# Patient Record
Sex: FEMALE | Race: WHITE | NOT HISPANIC OR LATINO | URBAN - METROPOLITAN AREA
[De-identification: names, ages, dates, MRNs, and addresses within clinical notes are randomized per-mention and may not be internally consistent; named-entity substitution may affect disease eponyms.]

---

## 2017-12-16 ENCOUNTER — EMERGENCY (EMERGENCY)
Facility: HOSPITAL | Age: 42
LOS: 1 days | Discharge: HOME | End: 2017-12-16
Attending: EMERGENCY MEDICINE
Payer: COMMERCIAL

## 2017-12-16 VITALS
WEIGHT: 132.28 LBS | HEIGHT: 67 IN | OXYGEN SATURATION: 100 % | HEART RATE: 80 BPM | SYSTOLIC BLOOD PRESSURE: 127 MMHG | RESPIRATION RATE: 16 BRPM | TEMPERATURE: 98 F | DIASTOLIC BLOOD PRESSURE: 74 MMHG

## 2017-12-16 PROCEDURE — 72125 CT NECK SPINE W/O DYE: CPT | Mod: 26

## 2017-12-16 PROCEDURE — 12011 RPR F/E/E/N/L/M 2.5 CM/<: CPT

## 2017-12-16 PROCEDURE — 99285 EMERGENCY DEPT VISIT HI MDM: CPT

## 2017-12-16 PROCEDURE — 90471 IMMUNIZATION ADMIN: CPT

## 2017-12-16 PROCEDURE — 90715 TDAP VACCINE 7 YRS/> IM: CPT

## 2017-12-16 PROCEDURE — 70486 CT MAXILLOFACIAL W/O DYE: CPT | Mod: 26

## 2017-12-16 PROCEDURE — 70486 CT MAXILLOFACIAL W/O DYE: CPT

## 2017-12-16 PROCEDURE — 70450 CT HEAD/BRAIN W/O DYE: CPT

## 2017-12-16 PROCEDURE — 99285 EMERGENCY DEPT VISIT HI MDM: CPT | Mod: 25

## 2017-12-16 PROCEDURE — 72125 CT NECK SPINE W/O DYE: CPT

## 2017-12-16 PROCEDURE — 70450 CT HEAD/BRAIN W/O DYE: CPT | Mod: 26

## 2017-12-16 RX ORDER — ACETAMINOPHEN 500 MG
975 TABLET ORAL ONCE
Qty: 0 | Refills: 0 | Status: COMPLETED | OUTPATIENT
Start: 2017-12-16 | End: 2017-12-16

## 2017-12-16 RX ORDER — TETANUS TOXOID, REDUCED DIPHTHERIA TOXOID AND ACELLULAR PERTUSSIS VACCINE, ADSORBED 5; 2.5; 8; 8; 2.5 [IU]/.5ML; [IU]/.5ML; UG/.5ML; UG/.5ML; UG/.5ML
0.5 SUSPENSION INTRAMUSCULAR ONCE
Qty: 0 | Refills: 0 | Status: COMPLETED | OUTPATIENT
Start: 2017-12-16 | End: 2017-12-16

## 2017-12-16 RX ADMIN — TETANUS TOXOID, REDUCED DIPHTHERIA TOXOID AND ACELLULAR PERTUSSIS VACCINE, ADSORBED 0.5 MILLILITER(S): 5; 2.5; 8; 8; 2.5 SUSPENSION INTRAMUSCULAR at 11:12

## 2017-12-16 RX ADMIN — Medication 975 MILLIGRAM(S): at 11:11

## 2017-12-16 NOTE — ED PROVIDER NOTE - ENMT, MLM
Airway patent, Nasal mucosa clear. Mouth with normal mucosa. Throat has no vesicles, no oropharyngeal exudates and uvula is midline. Dried blood on L nare.

## 2017-12-16 NOTE — ED PROVIDER NOTE - MUSCULOSKELETAL, MLM
Spine appears normal, range of motion is not limited, R paraspinal tenderness. Full range of motion on all extremities.

## 2017-12-16 NOTE — ED PROVIDER NOTE - CARE PLAN
Principal Discharge DX:	Fall, initial encounter  Secondary Diagnosis:	Facial laceration, initial encounter

## 2017-12-16 NOTE — ED ADULT TRIAGE NOTE - CHIEF COMPLAINT QUOTE
Slipped and fell off stairs last night at 11am with forehead laceration facial abrasions and swelling denies LOC

## 2017-12-16 NOTE — ED PROVIDER NOTE - SKIN, MLM
3cm deep laceration over L eyebrow, multiple superficial facial abrasions, b/l wrists superficial abrasions.

## 2017-12-16 NOTE — ED PROVIDER NOTE - OBJECTIVE STATEMENT
43 y/o F pt with no significant PMHx presents to ED c/o epistaxis, L periorbital bruising, lacerations, and headache s/p fall yesterday night. Pt states that she tripped and fell down a set of stairs. Pt denies LOC, fever, chills, or any other complaints. Pt denies blood thinner use and states that she does not know when she got her last TDAP. NKDA.

## 2017-12-21 ENCOUNTER — EMERGENCY (EMERGENCY)
Facility: HOSPITAL | Age: 42
LOS: 1 days | Discharge: HOME | End: 2017-12-21
Attending: EMERGENCY MEDICINE
Payer: COMMERCIAL

## 2017-12-21 VITALS
RESPIRATION RATE: 16 BRPM | SYSTOLIC BLOOD PRESSURE: 129 MMHG | DIASTOLIC BLOOD PRESSURE: 78 MMHG | WEIGHT: 130.07 LBS | HEART RATE: 79 BPM | HEIGHT: 65 IN | OXYGEN SATURATION: 99 % | TEMPERATURE: 97 F

## 2017-12-21 PROCEDURE — G0463: CPT

## 2018-01-31 NOTE — ED ADULT TRIAGE NOTE - WEIGHT IN LBS
Hypertension complicating pregnancy, childbirth and puerperium with baby delivered and  complication
130

## 2019-12-19 NOTE — ED ADULT NURSE NOTE - PAIN RATING/NUMBER SCALE (0-10): REST
Dr. Murillo,   Please addend your office visit note from 12/11/19 to include use and benefit of nebulizer and frequency of treatments (you ordered BID). Medicare will not approve nebulizer unless this is added.   0

## 2022-09-08 NOTE — ED ADULT NURSE NOTE - BREATH SOUNDS, MLM
Custom Shielding Preamble Text Will Not Be Included With Simple Simulations (.......... X X Y Cm): A lead shield of 0.762 mm thickness is utilized to form a molded, custom shield with a Clear

## 2023-12-07 ENCOUNTER — TELEPHONE (OUTPATIENT)
Dept: HEMATOLOGY ONCOLOGY | Facility: CLINIC | Age: 48
End: 2023-12-07

## 2023-12-07 NOTE — TELEPHONE ENCOUNTER
Appointment Schedule   Who are you speaking with? Sal Hernandez   If it is not the patient, are they listed on an active communication consent form? N/A   Which provider is the appointment scheduled with? Dr. Dick Vizcarra   At which location is the appointment scheduled for? [place]   When is the appointment scheduled? Please list date and time 1/12/24 1:00pm   What is the reason for this appointment? New patient consult   Did patient voice understanding of the details of this appointment? Yes   Was the no show policy reviewed with patient?  Yes

## 2023-12-12 ENCOUNTER — PATIENT OUTREACH (OUTPATIENT)
Dept: HEMATOLOGY ONCOLOGY | Facility: CLINIC | Age: 48
End: 2023-12-12

## 2023-12-13 NOTE — PROGRESS NOTES
I received a call from patient's daughter regarding patient's medical records along with what form would be needed from [facility] for records release. I informed her it was the release of health information form. She stated she has all patient's records including imaging on a disc. She informed me she would be forwarding to our medical records department and radiology department . I also updated patient's demographics .

## 2024-01-16 ENCOUNTER — VBI (OUTPATIENT)
Dept: ADMINISTRATIVE | Facility: OTHER | Age: 49
End: 2024-01-16

## 2024-01-16 NOTE — TELEPHONE ENCOUNTER
PDMP reviewed; no aberrant behavior identified, prescription authorized for postoperative pain.     Patient is currently on pain contract with another provider. Will notify that provider that I wrote for a week of short acting pain medication and that their office will resume management in 1 week.      Upon review of the In Basket request we were able to locate, review, and update the patient chart as requested for Mammogram.    Any additional questions or concerns should be emailed to the Practice Liaisons via the appropriate education email address, please do not reply via In Basket.    Thank you  BRIONNA BLACKWELL

## 2024-01-17 ENCOUNTER — PATIENT OUTREACH (OUTPATIENT)
Dept: HEMATOLOGY ONCOLOGY | Facility: CLINIC | Age: 49
End: 2024-01-17

## 2024-01-17 DIAGNOSIS — C50.911 MALIGNANT NEOPLASM OF RIGHT BREAST IN FEMALE, ESTROGEN RECEPTOR POSITIVE, UNSPECIFIED SITE OF BREAST: Primary | ICD-10-CM

## 2024-01-17 DIAGNOSIS — Z17.0 MALIGNANT NEOPLASM OF RIGHT BREAST IN FEMALE, ESTROGEN RECEPTOR POSITIVE, UNSPECIFIED SITE OF BREAST: Primary | ICD-10-CM

## 2024-01-17 NOTE — PROGRESS NOTES
Breast Oncology Nurse Navigator    Called patient's daughter Usman for initial outreach from nurse navigator.  Left voicemail message with contact information.  Requested a call back.  Referral placed for oncology social worker.

## 2024-01-18 ENCOUNTER — OFFICE VISIT (OUTPATIENT)
Dept: HEMATOLOGY ONCOLOGY | Facility: CLINIC | Age: 49
End: 2024-01-18
Payer: COMMERCIAL

## 2024-01-18 VITALS
SYSTOLIC BLOOD PRESSURE: 120 MMHG | TEMPERATURE: 98.2 F | BODY MASS INDEX: 22.5 KG/M2 | OXYGEN SATURATION: 97 % | HEIGHT: 66 IN | WEIGHT: 140 LBS | HEART RATE: 89 BPM | RESPIRATION RATE: 17 BRPM | DIASTOLIC BLOOD PRESSURE: 84 MMHG

## 2024-01-18 DIAGNOSIS — C50.911 MALIGNANT NEOPLASM OF RIGHT BREAST IN FEMALE, ESTROGEN RECEPTOR POSITIVE, UNSPECIFIED SITE OF BREAST: Primary | ICD-10-CM

## 2024-01-18 DIAGNOSIS — C77.9 REGIONAL LYMPH NODE METASTASIS PRESENT (HCC): ICD-10-CM

## 2024-01-18 DIAGNOSIS — Z17.0 MALIGNANT NEOPLASM OF RIGHT BREAST IN FEMALE, ESTROGEN RECEPTOR POSITIVE, UNSPECIFIED SITE OF BREAST: Primary | ICD-10-CM

## 2024-01-18 PROCEDURE — 99204 OFFICE O/P NEW MOD 45 MIN: CPT | Performed by: INTERNAL MEDICINE

## 2024-01-18 RX ORDER — SODIUM, POTASSIUM,MAG SULFATES 17.5-3.13G
SOLUTION, RECONSTITUTED, ORAL ORAL
COMMUNITY
Start: 2023-11-13

## 2024-01-18 RX ORDER — ONDANSETRON 4 MG/1
4 TABLET, FILM COATED ORAL
COMMUNITY
Start: 2023-09-26

## 2024-01-18 RX ORDER — OXYCODONE HYDROCHLORIDE 5 MG/1
5 TABLET ORAL
COMMUNITY
Start: 2023-09-26

## 2024-01-18 RX ORDER — BISACODYL 5 MG/1
5 TABLET, DELAYED RELEASE ORAL
COMMUNITY
Start: 2023-11-08

## 2024-01-18 RX ORDER — ACETAMINOPHEN 325 MG/1
325 TABLET ORAL
COMMUNITY
Start: 2023-09-26

## 2024-01-22 NOTE — PROGRESS NOTES
Consultation - Medical Oncology   Brandi Godfrey 48 y.o. female MRN: [id number]  Unit/Bed#:  Encounter: [id number]  Referring physician: Self-referral  Date of service: 1/18/2024  Reason for Consult: Breast cancer  HPI: Brandi Godfrey is a 48 y.o. year old female.  She is premenopausal.  She speaks Polish.  I took help for interpretation from [facility]  #[id number].  Also patient's daughter remained on line on the speaker phone the whole time and she is bilingual.  In September 2021 patient found a mass in the central portion of her right breast and she had 1.4 cm lesion in the right breast on imaging studies and she had biopsy that showed invasive breast cancer, ER positive, SD positive and HER2 negative.  She had MRI scan of the breasts and biopsy of left breast that did not show malignancy.  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  Patient had bilateral lumpectomies and final report was 2 cm invasive ductal carcinoma, no lymphovascular invasion and negative sentinel lymph nodes.  ER positive.  SD positive.  HER2 negative.  Ki-67 6%.  Oncotype score was 24.  No family history of breast or ovarian cancer.  Patient's mother had pleural malignancy.  Menarche at age 11 and first pregnancy at age 23.  Stage was 1A (T1c N0 M0 G1 ER positive, SD positive, HER2 negative and Oncotype 24)  No other significant past history other than peptic ulcer disease and previous history of lumpectomy.  Former smoker, 10 cigarettes a day for about 20 years and she quit smoking in 2021.  No alcohol.  Patient decided against adjuvant therapy, chemotherapy and hormonal therapy.  She wanted to try alternative/natural therapy.  For CHEK2 mutation she was encouraged to have breast imaging studies, GYN examination and colonoscopy.  Patient had  colonoscopy and there was no malignancy.  Colonoscopy  showed polyps.  On 9/21/2023 patient had right breast lumpectomy and sentinel lymph node sampling and report was invasive carcinoma of no  special type, grade 1, 1.2 cm tumor, 1 of 3 positive lymph node for metastatic disease, ER 99%, CT 99%, HER2 negative (1+).  Again patient decided not to have radiation or adjuvant therapy.  Patient wanted to try alternative therapy/natural therapy and she still feels the same way.  She wants to have scans.  She will take information on tamoxifen.  She would not be interested in ovarian suppression or BSO.  No symptoms at present other than anxiety.  ROS:  01/21/24 Reviewed 12 systems: See symptoms in HPI  Presently no  neurological, cardiac, pulmonary, GI and  symptoms.  No other symptoms.   No  fever, chills, bleeding, bone pains, skin rash, weight loss, night sweats, arthritic symptoms,  tiredness , weakness, numbness, claudication and gait problem. No frequent infections.  Not unusually sensitive to heat or cold. No swelling of the ankles. No swollen glands.  Patient is anxious.       Historical Information   No past medical history on file.  Past Surgical History:   Procedure Laterality Date    MRI BREAST BIOPSY BILATERAL Bilateral 7/20/2023     Social History   Social History     Substance and Sexual Activity   Alcohol Use Not on file     Social History     Substance and Sexual Activity   Drug Use Not on file     Social History     Tobacco Use   Smoking Status Not on file   Smokeless Tobacco Not on file     Family History: No family history on file.      Current Outpatient Medications:     acetaminophen (TYLENOL) 325 mg tablet, Take 325 mg by mouth (Patient not taking: Reported on 1/18/2024), Disp: , Rfl:     bisacodyl (DULCOLAX) 5 mg EC tablet, Take 5 mg by mouth (Patient not taking: Reported on 1/18/2024), Disp: , Rfl:     Na Sulfate-K Sulfate-Mg Sulf 17.5-3.13-1.6 GM/177ML SOLN, Take as directed (Patient not taking: Reported on 1/18/2024), Disp: , Rfl:     ondansetron (ZOFRAN) 4 mg tablet, Take 4 mg by mouth (Patient not taking: Reported on 1/18/2024), Disp: , Rfl:     oxyCODONE (ROXICODONE) 5 immediate  "release tablet, Take 5 mg by mouth (Patient not taking: Reported on 1/18/2024), Disp: , Rfl:     Not on File    Physical Exam:  Vitals:    01/18/24 1449   BP: 120/84   BP Location: Left arm   Patient Position: Sitting   Cuff Size: Adult   Pulse: 89   Resp: 17   Temp: 98.2 °F (36.8 °C)   SpO2: 97%   Weight: 63.5 kg (140 lb)   Height: 5' 6\" (1.676 m)     Alert, oriented, not in distress, vitals are above, no icterus, no oral thrush, no palpable neck mass, clear lung fields, regular heart rate, abdomen  soft and non tender, no palpable abdominal mass, no ascites, no edema of ankles, no calf tenderness, no focal neurological deficit, no skin rash, no palpable lymphadenopathy in the neck and axillary areas,  no clubbing.   Patient is anxious.  Performance status 0.  No lymphedema      Lab Results: I have reviewed all pertinent labs.  LABS:  No results found for this or any previous visit.      Imaging Studies: I have personally reviewed pertinent reports.    Pathology, and Other Studies: I have personally reviewed pertinent reports.    Reviewed records from [facility].    Assessment and Plan:  See diagnoses, orders instructions below  2021-right breast cancer T1c N0 M0 G1 ER positive, ND positive, HER2 negative, Ki-67 6% and Oncotype 24.  2023-recurrent cancer and right breast, T1c, N1, M0, G1 ER positive, ND positive, HER2 negative (1+)  Patient is status post bilateral mastectomies and reconstruction surgeries.  Patient is not interested in adjuvant systemic therapy or radiation therapy.  We discussed adjuvant hormonal therapy tamoxifen plus minus ovarian suppression or BSO.  We discussed ovarian suppression or BSO plus aromatase inhibitor.  Patient would prefer not to have any adjuvant therapy but agreed to take information on tamoxifen and that was provided to her.  Discussed the significance of CHEK2 mutation and risk of cancers, breast cancer, ovarian cancer and colon cancer and probably other " cancers.  Patient was interested in having scans and blood work but no therapy.  I gave the name and phone number of Dr. Melgar in case she would like to see her and discuss with her in Polish.  Patient and her daughter would discuss at home and decide about tamoxifen and about seeing Dr. Melgar.  They said they will get back to me.  They have my cell phone number.  They understand that there is potential for cure here from breast cancer.  That will be the goal.  Patient is capable of self-care.   Patient will continue to follow with  primary physician and other consultants.  Patient voiced understanding.  1. Malignant neoplasm of right breast in female, estrogen receptor positive, unspecified site of breast     - CT chest abdomen pelvis w contrast; Future  - NM bone scan whole body; Future  - CBC and differential; Future  - Comprehensive metabolic panel; Future  - Cancer antigen 27.29; Future    2. Regional lymph node metastasis present (HCC)    - CT chest abdomen pelvis w contrast; Future  - NM bone scan whole body; Future  - CBC and differential; Future  - Comprehensive metabolic panel; Future  - Cancer antigen 27.29; Future      Disclaimer: This document was prepared using a dictation device.  If a word or phrase is confusing, or does not make sense, this is likely due to recognition error which was not discovered during the providers review. If you believe an error has occurred, please Contact me through [facility]OPE Line service for yohannescation.    Counseling / Coordination of Care  ..  Provided counseling and support

## 2024-01-23 ENCOUNTER — DOCUMENTATION (OUTPATIENT)
Dept: HEMATOLOGY ONCOLOGY | Facility: CLINIC | Age: 49
End: 2024-01-23

## 2024-01-23 ENCOUNTER — PATIENT OUTREACH (OUTPATIENT)
Dept: HEMATOLOGY ONCOLOGY | Facility: CLINIC | Age: 49
End: 2024-01-23

## 2024-01-23 NOTE — PROGRESS NOTES
Per chart review patient is declining treatment but has information on Tamoxifen. Patient and daughter will review and discuss. Called daughter Usman and left voicemail stating if patient wishes to start treatment they can contact Oncology Care Coordination. Provided my dfirect phone number and NN july Alanis's as well.     Future Appointments   Date Time Provider Department Center   2/8/2024 11:30 AM [facility] CT 1 [facility]   2/8/2024 12:00 PM [facility]   2/8/2024  2:30 PM [facility]   2/29/2024  1:20 PM Mike Cruz MD HEM ONC [facility]         OCC will follow up after patient sees Dr. Cruz on 2/29/2024. If no treatment care coordination will sign off.

## 2024-01-23 NOTE — PROGRESS NOTES
OCC outreach requested by ALVARADO Sandra Zeke after her medical oncology appt with Dr. Cruz 1/18/2024. Chart review completed prior to outreach.    9/2021-Stage 1A (T1c N0 M0 G1 ER positive, WI positive, HER2 negative and Oncotype 24) right breast cancer. 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 s/p lumpectomy. Patient declined adjuvant treatment.     9/2023- s/p R lumpectomy w/ SLNbx. Invasive carcinoma of no special type, grade 1, 1.2 cm tumor, 1 of 3 positive lymph node for metastatic disease, ER 99%, WI 99%, HER2 negative (1+). Patient declined adjuvant treatment.     1/18/2024 Dr. Cruz- Patient wants to try alternative therapy/natural therapy. She wants to have scans. She would not be interested in ovarian suppression or BSO. Information provided to patient on tamoxifen and a referral placed to Dr. Melgar.     Future Appointments   Date Time Provider Department Center   2/8/2024 11:30 AM [facility] CT 1 [facility]   2/8/2024 12:00 PM [facility]   2/8/2024  2:30 PM BE [facility] 1 [facility]   2/29/2024  1:20 PM Mike Cruz MD [facility]

## 2024-02-06 ENCOUNTER — APPOINTMENT (OUTPATIENT)
Dept: LAB | Facility: CLINIC | Age: 49
End: 2024-02-06
Payer: COMMERCIAL

## 2024-02-06 DIAGNOSIS — C50.911 MALIGNANT NEOPLASM OF RIGHT BREAST IN FEMALE, ESTROGEN RECEPTOR POSITIVE, UNSPECIFIED SITE OF BREAST: ICD-10-CM

## 2024-02-06 DIAGNOSIS — C77.9 REGIONAL LYMPH NODE METASTASIS PRESENT (HCC): ICD-10-CM

## 2024-02-06 DIAGNOSIS — Z17.0 MALIGNANT NEOPLASM OF RIGHT BREAST IN FEMALE, ESTROGEN RECEPTOR POSITIVE, UNSPECIFIED SITE OF BREAST: ICD-10-CM

## 2024-02-06 LAB
ALBUMIN SERPL BCP-MCNC: 4.1 G/DL (ref 3.5–5)
ALP SERPL-CCNC: 44 U/L (ref 34–104)
ALT SERPL W P-5'-P-CCNC: 12 U/L (ref 7–52)
ANION GAP SERPL CALCULATED.3IONS-SCNC: 7 MMOL/L
AST SERPL W P-5'-P-CCNC: 13 U/L (ref 13–39)
BASOPHILS # BLD AUTO: 0.05 THOUSANDS/ÂΜL (ref 0–0.1)
BASOPHILS NFR BLD AUTO: 1 % (ref 0–1)
BILIRUB SERPL-MCNC: 1.16 MG/DL (ref 0.2–1)
BUN SERPL-MCNC: 10 MG/DL (ref 5–25)
CALCIUM SERPL-MCNC: 9.4 MG/DL (ref 8.4–10.2)
CHLORIDE SERPL-SCNC: 105 MMOL/L (ref 96–108)
CO2 SERPL-SCNC: 28 MMOL/L (ref 21–32)
CREAT SERPL-MCNC: 0.63 MG/DL (ref 0.6–1.3)
EOSINOPHIL # BLD AUTO: 0.12 THOUSAND/ÂΜL (ref 0–0.61)
EOSINOPHIL NFR BLD AUTO: 3 % (ref 0–6)
ERYTHROCYTE [DISTWIDTH] IN BLOOD BY AUTOMATED COUNT: 12.6 % (ref 11.6–15.1)
GFR SERPL CREATININE-BSD FRML MDRD: 107 ML/MIN/1.73SQ M
GLUCOSE P FAST SERPL-MCNC: 99 MG/DL (ref 65–99)
HCT VFR BLD AUTO: 41.2 % (ref 34.8–46.1)
HGB BLD-MCNC: 14 G/DL (ref 11.5–15.4)
IMM GRANULOCYTES # BLD AUTO: 0.01 THOUSAND/UL (ref 0–0.2)
IMM GRANULOCYTES NFR BLD AUTO: 0 % (ref 0–2)
LYMPHOCYTES # BLD AUTO: 1.97 THOUSANDS/ÂΜL (ref 0.6–4.47)
LYMPHOCYTES NFR BLD AUTO: 41 % (ref 14–44)
MCH RBC QN AUTO: 29.9 PG (ref 26.8–34.3)
MCHC RBC AUTO-ENTMCNC: 34 G/DL (ref 31.4–37.4)
MCV RBC AUTO: 88 FL (ref 82–98)
MONOCYTES # BLD AUTO: 0.37 THOUSAND/ÂΜL (ref 0.17–1.22)
MONOCYTES NFR BLD AUTO: 8 % (ref 4–12)
NEUTROPHILS # BLD AUTO: 2.25 THOUSANDS/ÂΜL (ref 1.85–7.62)
NEUTS SEG NFR BLD AUTO: 47 % (ref 43–75)
NRBC BLD AUTO-RTO: 0 /100 WBCS
PLATELET # BLD AUTO: 205 THOUSANDS/UL (ref 149–390)
PMV BLD AUTO: 10.9 FL (ref 8.9–12.7)
POTASSIUM SERPL-SCNC: 4.7 MMOL/L (ref 3.5–5.3)
PROT SERPL-MCNC: 6.2 G/DL (ref 6.4–8.4)
RBC # BLD AUTO: 4.69 MILLION/UL (ref 3.81–5.12)
SODIUM SERPL-SCNC: 140 MMOL/L (ref 135–147)
WBC # BLD AUTO: 4.77 THOUSAND/UL (ref 4.31–10.16)

## 2024-02-06 PROCEDURE — 80053 COMPREHEN METABOLIC PANEL: CPT

## 2024-02-06 PROCEDURE — 36415 COLL VENOUS BLD VENIPUNCTURE: CPT

## 2024-02-06 PROCEDURE — 85025 COMPLETE CBC W/AUTO DIFF WBC: CPT

## 2024-02-06 PROCEDURE — 86300 IMMUNOASSAY TUMOR CA 15-3: CPT

## 2024-02-07 LAB — CANCER AG27-29 SERPL-ACNC: 12 U/ML (ref 0–38.6)

## 2024-02-08 ENCOUNTER — HOSPITAL ENCOUNTER (OUTPATIENT)
Dept: RADIOLOGY | Facility: HOSPITAL | Age: 49
Discharge: HOME | End: 2024-02-08
Attending: INTERNAL MEDICINE
Payer: COMMERCIAL

## 2024-02-08 DIAGNOSIS — Z17.0 MALIGNANT NEOPLASM OF RIGHT BREAST IN FEMALE, ESTROGEN RECEPTOR POSITIVE, UNSPECIFIED SITE OF BREAST: ICD-10-CM

## 2024-02-08 DIAGNOSIS — C50.911 MALIGNANT NEOPLASM OF RIGHT BREAST IN FEMALE, ESTROGEN RECEPTOR POSITIVE, UNSPECIFIED SITE OF BREAST: ICD-10-CM

## 2024-02-08 DIAGNOSIS — C77.9 REGIONAL LYMPH NODE METASTASIS PRESENT (HCC): ICD-10-CM

## 2024-02-08 PROCEDURE — G1004 CDSM NDSC: HCPCS

## 2024-02-08 PROCEDURE — 71260 CT THORAX DX C+: CPT

## 2024-02-08 PROCEDURE — 74177 CT ABD & PELVIS W/CONTRAST: CPT

## 2024-02-08 PROCEDURE — A9503 TC99M MEDRONATE: HCPCS

## 2024-02-08 PROCEDURE — 78306 BONE IMAGING WHOLE BODY: CPT

## 2024-02-08 RX ADMIN — IOHEXOL 100 ML: 350 INJECTION, SOLUTION INTRAVENOUS at 12:39

## 2024-02-16 ENCOUNTER — TELEPHONE (OUTPATIENT)
Dept: HEMATOLOGY ONCOLOGY | Facility: CLINIC | Age: 49
End: 2024-02-16

## 2024-02-21 ENCOUNTER — TELEPHONE (OUTPATIENT)
Dept: HEMATOLOGY ONCOLOGY | Facility: CLINIC | Age: 49
End: 2024-02-21

## 2024-02-21 ENCOUNTER — HOSPITAL ENCOUNTER (OUTPATIENT)
Dept: MRI IMAGING | Facility: HOSPITAL | Age: 49
Discharge: HOME | End: 2024-02-21
Attending: INTERNAL MEDICINE
Payer: COMMERCIAL

## 2024-02-21 DIAGNOSIS — C77.9 REGIONAL LYMPH NODE METASTASIS PRESENT (HCC): ICD-10-CM

## 2024-02-21 DIAGNOSIS — C50.911 MALIGNANT NEOPLASM OF RIGHT BREAST IN FEMALE, ESTROGEN RECEPTOR POSITIVE, UNSPECIFIED SITE OF BREAST: Primary | ICD-10-CM

## 2024-02-21 DIAGNOSIS — C50.911 MALIGNANT NEOPLASM OF RIGHT BREAST IN FEMALE, ESTROGEN RECEPTOR POSITIVE, UNSPECIFIED SITE OF BREAST: ICD-10-CM

## 2024-02-21 DIAGNOSIS — Z17.0 MALIGNANT NEOPLASM OF RIGHT BREAST IN FEMALE, ESTROGEN RECEPTOR POSITIVE, UNSPECIFIED SITE OF BREAST: Primary | ICD-10-CM

## 2024-02-21 DIAGNOSIS — K76.9 LIVER LESION: ICD-10-CM

## 2024-02-21 DIAGNOSIS — Z17.0 MALIGNANT NEOPLASM OF RIGHT BREAST IN FEMALE, ESTROGEN RECEPTOR POSITIVE, UNSPECIFIED SITE OF BREAST: ICD-10-CM

## 2024-02-21 PROCEDURE — A9585 GADOBUTROL INJECTION: HCPCS | Performed by: INTERNAL MEDICINE

## 2024-02-21 PROCEDURE — G1004 CDSM NDSC: HCPCS

## 2024-02-21 PROCEDURE — 74183 MRI ABD W/O CNTR FLWD CNTR: CPT

## 2024-02-21 RX ORDER — GADOBUTROL 604.72 MG/ML
6 INJECTION INTRAVENOUS
Status: COMPLETED | OUTPATIENT
Start: 2024-02-21 | End: 2024-02-21

## 2024-02-21 RX ADMIN — GADOBUTROL 6 ML: 604.72 INJECTION INTRAVENOUS at 19:20

## 2024-02-21 NOTE — TELEPHONE ENCOUNTER
----- Message from Mike Cruz MD sent at 2/21/2024 10:25 AM EST -----    ----- Message -----  From: Interface, Radiology Results In  Sent: 2/16/2024  11:34 AM EST  To: Mike Cruz MD

## 2024-02-21 NOTE — TELEPHONE ENCOUNTER
Spoke with patient's daughter to make her aware of the CT scan findings. There are two spots noted on the liver, one of which is a cyst. The other spot in unclear. Dr. Cruz would like additional imaging done with an MRI of the abdomen    Please schedule MRI before the end of the month as their insurance will run out at that time    Thanks!

## 2024-02-21 NOTE — TELEPHONE ENCOUNTER
Patient Call    Who are you speaking with? [facility]ri     If it is not the patient, are they listed on an active communication consent form? N/A   What is the reason for this call? She didn't have a script for mri scheduled at 615. I told her the order was in her chart with the info she needed   Does this require a call back? N/A   If a call back is required, please list best call back number N/a   If a call back is required, advise that a message will be forwarded to their care team and someone will return their call as soon as possible.   Did you relay this information to the patient? N/A

## 2024-02-21 NOTE — TELEPHONE ENCOUNTER
Called patient and left her know about her stat MRI order that needed to be scheduled. Got her scheduled at [place] on 2 /21/24 at  615 pm they verbally confirmed over the phone.  Thank  you

## 2024-02-29 ENCOUNTER — OFFICE VISIT (OUTPATIENT)
Dept: HEMATOLOGY ONCOLOGY | Facility: CLINIC | Age: 49
End: 2024-02-29
Payer: COMMERCIAL

## 2024-02-29 VITALS
RESPIRATION RATE: 17 BRPM | BODY MASS INDEX: 22.5 KG/M2 | HEART RATE: 61 BPM | WEIGHT: 140 LBS | HEIGHT: 66 IN | OXYGEN SATURATION: 98 % | TEMPERATURE: 98 F | DIASTOLIC BLOOD PRESSURE: 80 MMHG | SYSTOLIC BLOOD PRESSURE: 124 MMHG

## 2024-02-29 DIAGNOSIS — K76.9 LIVER LESION: ICD-10-CM

## 2024-02-29 DIAGNOSIS — Z15.02 CHEK2-RELATED BREAST CANCER: ICD-10-CM

## 2024-02-29 DIAGNOSIS — C50.919 CHEK2-RELATED BREAST CANCER: ICD-10-CM

## 2024-02-29 DIAGNOSIS — Z17.0 MALIGNANT NEOPLASM OF RIGHT BREAST IN FEMALE, ESTROGEN RECEPTOR POSITIVE, UNSPECIFIED SITE OF BREAST: Primary | ICD-10-CM

## 2024-02-29 DIAGNOSIS — K76.89 LIVER CYST: ICD-10-CM

## 2024-02-29 DIAGNOSIS — C50.911 MALIGNANT NEOPLASM OF RIGHT BREAST IN FEMALE, ESTROGEN RECEPTOR POSITIVE, UNSPECIFIED SITE OF BREAST: Primary | ICD-10-CM

## 2024-02-29 DIAGNOSIS — Z15.09 CHEK2-RELATED BREAST CANCER: ICD-10-CM

## 2024-02-29 DIAGNOSIS — Z15.89 CHEK2-RELATED BREAST CANCER: ICD-10-CM

## 2024-02-29 DIAGNOSIS — C77.9 REGIONAL LYMPH NODE METASTASIS PRESENT (HCC): ICD-10-CM

## 2024-02-29 PROCEDURE — 99214 OFFICE O/P EST MOD 30 MIN: CPT | Performed by: INTERNAL MEDICINE

## 2024-02-29 NOTE — PROGRESS NOTES
HPI: Continuation of care.  Patient speaks Polish.  She is here with her daughter and patient's daughter is bilingual.  Follow-up visit for breast cancer with recurrence.  She is premenopausal.  In September 2021 patient found a mass in the central portion of her right breast and she had 1.4 cm lesion in the right breast on imaging studies and she had biopsy that showed invasive breast cancer, ER positive, HI positive and HER2 negative.  She had MRI scan of the breasts and biopsy of left breast that did not show malignancy.  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  Patient had bilateral lumpectomies and final report was 2 cm invasive ductal carcinoma, no lymphovascular invasion and negative sentinel lymph nodes.  ER positive.  HI positive.  HER2 negative.  Ki-67 6%.  Oncotype score was 24.  No family history of breast or ovarian cancer.  Patient's mother had pleural malignancy.  Menarche at age 11 and first pregnancy at age 23.  Stage was 1A (T1c N0 M0 G1 ER positive, HI positive, HER2 negative and Oncotype 24)  No other significant past history other than peptic ulcer disease and previous history of lumpectomy.  Former smoker, 10 cigarettes a day for about 20 years and she quit smoking in 2021.  No alcohol.  Patient decided against adjuvant therapy, chemotherapy and hormonal therapy.  She wanted to try alternative/natural therapy.  For CHEK2 mutation she was encouraged to have breast imaging studies, GYN examination and colonoscopy.  Patient had  colonoscopy and there was no malignancy.  Colonoscopy  showed polyps.  Patient saw her gynecologist recently in [place] for GYN examination and breast examination.  On 9/21/2023 patient had right breast lumpectomy and sentinel lymph node sampling and report was invasive carcinoma of no special type, grade 1, 1.2 cm tumor, 1 of 3 positive lymph node for metastatic disease, ER 99%, HI 99%, HER2 negative (1+).  Again patient decided not to have radiation or adjuvant  therapy.  Patient wanted to try alternative therapy/natural therapy and she still feels the same way.  She wanted to have scans.  Recently she had CT scan and bone scan and also MRI scan of the abdomen because of liver lesion and there was no evidence of malignancy/metastatic disease.   The last time patient took information home  on tamoxifen.  Again patient has decided not to have any adjuvant therapy for breast cancer.  She would not be interested in ovarian suppression or BSO.  No symptoms at present other than anxiety.  ROS:  03/03/24 Reviewed 12 systems: See symptoms in HPI  Presently no  neurological, cardiac, pulmonary, GI and  symptoms.  No other symptoms.   No other symptoms like fever, chills, bleeding, bone pains, skin rash, weight loss, night sweats, arthritic symptoms,  tiredness , weakness, numbness, claudication and gait problem. No frequent infections.  Not unusually sensitive to heat or cold. No swelling of the ankles. No swollen glands.  Patient is anxious.       Historical Information   No past medical history on file.  Past Surgical History:   Procedure Laterality Date   • MRI BREAST BIOPSY BILATERAL Bilateral 7/20/2023     Social History   Social History     Substance and Sexual Activity   Alcohol Use Not on file     Social History     Substance and Sexual Activity   Drug Use Not on file     Social History     Tobacco Use   Smoking Status Not on file   Smokeless Tobacco Not on file     Family History: No family history on file.      Current Outpatient Medications:   •  acetaminophen (TYLENOL) 325 mg tablet, Take 325 mg by mouth (Patient not taking: Reported on 1/18/2024), Disp: , Rfl:   •  bisacodyl (DULCOLAX) 5 mg EC tablet, Take 5 mg by mouth (Patient not taking: Reported on 1/18/2024), Disp: , Rfl:   •  Na Sulfate-K Sulfate-Mg Sulf 17.5-3.13-1.6 GM/177ML SOLN, Take as directed (Patient not taking: Reported on 1/18/2024), Disp: , Rfl:   •  ondansetron (ZOFRAN) 4 mg tablet, Take 4 mg by  "mouth (Patient not taking: Reported on 1/18/2024), Disp: , Rfl:   •  oxyCODONE (ROXICODONE) 5 immediate release tablet, Take 5 mg by mouth (Patient not taking: Reported on 1/18/2024), Disp: , Rfl:     No Known Allergies    Physical Exam:  Vitals:    02/29/24 1321   BP: 124/80   BP Location: Left arm   Patient Position: Sitting   Cuff Size: Adult   Pulse: 61   Resp: 17   Temp: 98 °F (36.7 °C)   SpO2: 98%   Weight: 63.5 kg (140 lb)   Height: 5' 6\" (1.676 m)     Patient is alert and oriented.  Patient not in distress. Vital signs as above.  There is no icterus.  There is no oral thrush.  There is no palpable neck mass.  Lung fields are clear to percussion and auscultation.  Heart rate is regular.  There is no palpable abdominal mass.  Abdomen is soft and nontender.  There is no ascites.  There is no edema of the ankles.  There is no calf tenderness.  There is no focal neurological deficit, no skin rash, no palpable lymphadenopathy in the neck and axillary areas,  no clubbing.   Patient is anxious.  Performance status 0.  No lymphedema      Lab Results: I have reviewed all pertinent labs.  LABS:    Results for orders placed or performed in visit on 02/06/24   CBC and differential   Result Value Ref Range    WBC 4.77 4.31 - 10.16 Thousand/uL    RBC 4.69 3.81 - 5.12 Million/uL    Hemoglobin 14.0 11.5 - 15.4 g/dL    Hematocrit 41.2 34.8 - 46.1 %    MCV 88 82 - 98 fL    MCH 29.9 26.8 - 34.3 pg    MCHC 34.0 31.4 - 37.4 g/dL    RDW 12.6 11.6 - 15.1 %    MPV 10.9 8.9 - 12.7 fL    Platelets 205 149 - 390 Thousands/uL    nRBC 0 /100 WBCs    Neutrophils Relative 47 43 - 75 %    Immat GRANS % 0 0 - 2 %    Lymphocytes Relative 41 14 - 44 %    Monocytes Relative 8 4 - 12 %    Eosinophils Relative 3 0 - 6 %    Basophils Relative 1 0 - 1 %    Neutrophils Absolute 2.25 1.85 - 7.62 Thousands/µL    Immature Grans Absolute 0.01 0.00 - 0.20 Thousand/uL    Lymphocytes Absolute 1.97 0.60 - 4.47 Thousands/µL    Monocytes Absolute 0.37 0.17 - " 1.22 Thousand/µL    Eosinophils Absolute 0.12 0.00 - 0.61 Thousand/µL    Basophils Absolute 0.05 0.00 - 0.10 Thousands/µL   Comprehensive metabolic panel   Result Value Ref Range    Sodium 140 135 - 147 mmol/L    Potassium 4.7 3.5 - 5.3 mmol/L    Chloride 105 96 - 108 mmol/L    CO2 28 21 - 32 mmol/L    ANION GAP 7 mmol/L    BUN 10 5 - 25 mg/dL    Creatinine 0.63 0.60 - 1.30 mg/dL    Glucose, Fasting 99 65 - 99 mg/dL    Calcium 9.4 8.4 - 10.2 mg/dL    AST 13 13 - 39 U/L    ALT 12 7 - 52 U/L    Alkaline Phosphatase 44 34 - 104 U/L    Total Protein 6.2 (L) 6.4 - 8.4 g/dL    Albumin 4.1 3.5 - 5.0 g/dL    Total Bilirubin 1.16 (H) 0.20 - 1.00 mg/dL    eGFR 107 ml/min/1.73sq m   Cancer antigen 27.29   Result Value Ref Range    CA 27-29 12.0 0.0 - 38.6 U/mL         Imaging Studies: I have personally reviewed pertinent reports.    CT chest abdomen pelvis w contrast  Status: Final result     PACS Images     Show images for CT chest abdomen pelvis w contrast    CT chest abdomen pelvis w contrast: Result Notes     Mike Cruz MD  2/16/2024 11:44 PM EST       Patient has office appointment on 2/29/2024.            Study Result    Narrative & Impression   CT CHEST, ABDOMEN AND PELVIS WITH IV CONTRAST     INDICATION: C50.911: Malignant neoplasm of unspecified site of right female breast  Z17.0: Estrogen receptor positive status (ER+)  C77.9: Secondary and unspecified malignant neoplasm of lymph node, unspecified. Breast cancer, lymph node metastasis, staging right breast lumpectomy 9/21/2023 with invasive carcinoma, 1 out of 3 positive lymph nodes positive for metastasis. CHEK2   mutation.     COMPARISON: Chest radiograph 11/24/2021. Same-day bone scan 2/8/2024.     TECHNIQUE: CT examination of the chest, abdomen and pelvis was performed. Multiplanar 2D reformatted images were created from the source data.     This examination, like all CT scans performed in the [facility], was performed utilizing techniques  to minimize radiation dose exposure, including the use of iterative reconstruction and automated exposure control. Radiation dose length   product (DLP) for this visit: 368.25 mGy-cm     IV Contrast: 100 mL of iohexol (OMNIPAQUE)  Enteric Contrast: Not administered.     FINDINGS:     CHEST     LUNGS: No consolidation. No edema. Bibasilar hypoventilatory changes. Calcified granuloma left upper lobe (series 303 image 56).  A 3 mm juxtapleural typical perifissural nodule in the left upper lobe (601 image 68) is consistent with intrapulmonary   lymph node. Scattered tiny solid nodules:  - 3 mm solid nodule in the right upper lobe (303 image 36)  - 3 mm solid nodule in the left upper lobe (series 303 image 124)  - 3 mm solid nodule in the right upper lobe (series 303 image 134)  - Few other sub-3 mm nodules.     PLEURA: Trace bilateral pleural effusions. No pneumothorax..     HEART/GREAT VESSELS: Heart is unremarkable for patient's age. No thoracic aortic aneurysm.     MEDIASTINUM AND CAMILO: Small amount of soft tissue attenuation in the anterior mediastinum likely representing thymic rebound; concave margins are maintained.     CHEST WALL AND LOWER NECK: Bilateral lumpectomy changes surgical clips superficial to the bilateral pectoralis musculature and in both axilla.     ABDOMEN     LIVER/BILIARY TREE: Simple cyst measuring 1.2 x 0.8 cm in segment 2/3 of the liver (301 image 97). Additional scattered focal sub-7 mm hypodensities in both lobes, the largest in segment 5 measuring 6 mm (series 301/116), additional lesions on image 81,   112, 124.     GALLBLADDER: No calcified gallstones. No pericholecystic inflammatory change.     SPLEEN: Unremarkable.     PANCREAS: Unremarkable.     ADRENAL GLANDS: Unremarkable.     KIDNEYS/URETERS: Unremarkable. No hydronephrosis.     STOMACH AND BOWEL: The stomach is within normal limits. Normal caliber small bowel. Normal caliber large bowel. No evidence of active small or large bowel  inflammatory process.     APPENDIX: Normal.     ABDOMINOPELVIC CAVITY: No ascites. No pneumoperitoneum. No lymphadenopathy.     VESSELS: Unremarkable for patient's age.     PELVIS     REPRODUCTIVE ORGANS: The uterus is enlarged and heterogeneously enhancing in the fundus with multiple well delineated hypodensities and calcifications suggestive of leiomyomas.     URINARY BLADDER: Unremarkable.     ABDOMINAL WALL/INGUINAL REGIONS: Prior ventral hernia repair.     BONES: No acute fracture or suspicious osseous lesion.     IMPRESSION:     1.  No convincing evidence of recurrent or metastatic disease in the chest, abdomen, or pelvis.  2.  Few scattered 3 mm solid pulmonary nodules, likely of no clinical significance but without established stability. Based on current Fleischner Society 2017 Guidelines on incidental pulmonary nodule, patients with a known malignancy are at increased   risk of metastasis and should receive follow-up CT at intervals appropriate for the type of cancer and its risk of pulmonary metastases.  3.  Indeterminate hypodense hepatic lesions measuring up to 6 mm. Although incidentally detected lesions <1 cm are commonly benign even in high-risk patients (e.g. with known malignancy), follow-up contrast-enhanced MRI abdomen is recommended in 3-6   months for characterization and to document the presence or absence of growth (Reference: J Am Deion Radiol 2017; 14:6399-1040).     The study was marked in EPIC for significant notification.     Resident: JAUN MONIQUE I, the attending radiologist, have reviewed the images and agree with the final report above.     Workstation performed: [id number]        Imaging    CT chest abdomen pelvis w contrast (Order: [id number]) - 2/8/2024  NM bone scan whole body  Status: Final result     PACS Images     Show images for NM bone scan whole body  Study Result    Result Text   BONE SCAN  WHOLE BODY     INDICATION: C50.911: Malignant neoplasm of unspecified site  of right female breast  Z17.0: Estrogen receptor positive status (ER+)  C77.9: Secondary and unspecified malignant neoplasm of lymph node, unspecified     PREVIOUS FILM CORRELATION:    Skeletal structures of CT of chest, abdomen, and pelvis dated 2/8/2024     TECHNIQUE:   This study was performed following the intravenous administration of 26.6 mCi Tc-99m labeled MDP.  Delayed, anterior and posterior whole body images were acquired, 2-3 hours after radiopharmaceutical administration.     FINDINGS:     There is no focal tracer activity characteristic of osseous metastatic disease.     Both kidneys are visualized.     IMPRESSION:     1.  No scintigraphic evidence of osseous metastasis.           Workstation performed: [id number]      MRI abdomen w wo contrast  Status: Final result     PACS Images     Show images for MRI abdomen w wo contrast  Study Result    Narrative & Impression   MRI - ABDOMEN - WITH AND WITHOUT CONTRAST     INDICATION: 48 years / Female. C50.911: Malignant neoplasm of unspecified site of right female breast  Z17.0: Estrogen receptor positive status (ER+)  C77.9: Secondary and unspecified malignant neoplasm of lymph node, unspecified  K76.9: Liver disease, unspecified.     COMPARISON:     TECHNIQUE: Multiplanar/multisequence MRI of the abdomen was performed before and after administration of contrast.     IV Contrast: 6 mL of Gadobutrol injection (SINGLE-DOSE)     FINDINGS:     LOWER CHEST: Unremarkable.     LIVER:  Normal in size and configuration.  No suspicious mass.  Unchanged 12 mm segment 2/3 hepatic #6/13 and 3/19. Numerous other sub-5 mm cystic lesions throughout the liver are also likely simple cysts; for example 4 mm segment 5/6 cyst #14/67.  Patent hepatic and portal veins.     BILE DUCTS: No intrahepatic or extrahepatic bile duct dilation.     GALLBLADDER: Normal     PANCREAS: Unremarkable.     ADRENAL GLANDS: Unremarkable.     SPLEEN: Normal.     KIDNEYS/PROXIMAL URETERS: No  hydroureteronephrosis. No suspicious renal mass.     BOWEL: No dilated loops of bowel.     PERITONEUM/RETROPERITONEUM: No ascites.     LYMPH NODES: No abdominal lymphadenopathy.     VESSELS: No aneurysm.     ABDOMINAL WALL: Unremarkable     BONES: No suspicious osseous lesion.     IMPRESSION:     Stable 12 mm segment 2 thoracic 3 hepatic cyst.     Numerous sub-5 mm T2 hyperintense likely nonenhancing lesions throughout the liver also likely simple cysts; these are difficult to fully characterize secondary to their diminutive size.        Workstation performed: [id number]        Imaging    MRI abdomen w wo contrast (Order: [id number]) - 2/21/2024    Result History    MRI abdomen w wo contrast (Order #[id number]) on 2/22/2024 - Order Result History Report      Pathology, and Other Studies: I have personally reviewed pertinent reports.    Reviewed records from [facility].    Assessment and Plan:  See diagnoses, orders instructions below  Combination of care  2021-right breast cancer T1c N0 M0 G1 ER positive, SC positive, HER2 negative, Ki-67 6% and Oncotype 24.  2023-recurrent cancer and right breast, T1c, N1, M0, G1 ER positive, SC positive, HER2 negative (1+)  Patient is status post bilateral mastectomies and reconstruction surgeries.  Patient is not interested in adjuvant systemic therapy or radiation therapy.  We re discussed adjuvant hormonal therapy tamoxifen plus minus ovarian suppression or BSO.  We re discussed ovarian suppression or BSO plus aromatase inhibitor.  Patient would prefer not to have any adjuvant therapy .  Again discussed the significance of CHEK2 mutation and risk of cancers, breast cancer, ovarian cancer and colon cancer and probably other cancers.  They understand that there is potential for cure here from breast cancer.  That will be the goal.  Patient is capable of self-care.   Patient will continue to follow with  primary physician and other consultants.  Patient voiced  understanding.  1. Malignant neoplasm of right breast in female, estrogen receptor positive, unspecified site of breast     - CBC and differential; Future  - Comprehensive metabolic panel; Future  - Cancer antigen 27.29; Future    2. Regional lymph node metastasis present (HCC)      3. Liver lesion      4. Liver cyst      5. CHEK2-related breast cancer     Blood work prior to next visit in 6 months    Disclaimer: This document was prepared using a dictation device.  If a word or phrase is confusing, or does not make sense, this is likely due to recognition error which was not discovered during the providers review. If you believe an error has occurred, please Contact me through [facility] [employer] Line service for ernst?cation.    Counseling / Coordination of Care  ..  Provided counseling and support

## 2024-03-04 ENCOUNTER — PATIENT OUTREACH (OUTPATIENT)
Dept: HEMATOLOGY ONCOLOGY | Facility: CLINIC | Age: 49
End: 2024-03-04

## 2024-03-04 NOTE — PROGRESS NOTES
I outreached Pt to ask if there were any questions or concerns after her Medical Oncology Consult.  I spoke to Pt's daughter (Usman) and she states that the Pt is refusing Adjuvant Chemotherapy, Hormonal and Radiation Therapy as well.  Pt would only like to use alternative/natural Therapy moving forward.    Pt has no questions or concerns at this time, I did provide my contact information and  encouraged her to call if any were to arise.

## 2024-05-09 NOTE — ED PROVIDER NOTE - CONSTITUTIONAL, MLM
Discharge instructions reviewed with patient by ED DOM Johnson, PHANI  05/09/24 8181     normal... Well appearing, well nourished, awake, alert, oriented to person, place, time/situation and in no apparent distress.

## 2024-08-19 ENCOUNTER — APPOINTMENT (OUTPATIENT)
Dept: LAB | Facility: CLINIC | Age: 49
End: 2024-08-19
Payer: COMMERCIAL

## 2024-08-19 DIAGNOSIS — C50.911 MALIGNANT NEOPLASM OF RIGHT BREAST IN FEMALE, ESTROGEN RECEPTOR POSITIVE, UNSPECIFIED SITE OF BREAST (HCC): ICD-10-CM

## 2024-08-19 DIAGNOSIS — Z17.0 MALIGNANT NEOPLASM OF RIGHT BREAST IN FEMALE, ESTROGEN RECEPTOR POSITIVE, UNSPECIFIED SITE OF BREAST (HCC): ICD-10-CM

## 2024-08-19 LAB
ALBUMIN SERPL BCG-MCNC: 4 G/DL (ref 3.5–5)
ALP SERPL-CCNC: 50 U/L (ref 34–104)
ALT SERPL W P-5'-P-CCNC: 13 U/L (ref 7–52)
ANION GAP SERPL CALCULATED.3IONS-SCNC: 7 MMOL/L (ref 4–13)
AST SERPL W P-5'-P-CCNC: 13 U/L (ref 13–39)
BASOPHILS # BLD AUTO: 0.06 THOUSANDS/ÂΜL (ref 0–0.1)
BASOPHILS NFR BLD AUTO: 1 % (ref 0–1)
BILIRUB SERPL-MCNC: 0.89 MG/DL (ref 0.2–1)
BUN SERPL-MCNC: 12 MG/DL (ref 5–25)
CALCIUM SERPL-MCNC: 8.8 MG/DL (ref 8.4–10.2)
CHLORIDE SERPL-SCNC: 106 MMOL/L (ref 96–108)
CO2 SERPL-SCNC: 28 MMOL/L (ref 21–32)
CREAT SERPL-MCNC: 0.66 MG/DL (ref 0.6–1.3)
EOSINOPHIL # BLD AUTO: 0.29 THOUSAND/ÂΜL (ref 0–0.61)
EOSINOPHIL NFR BLD AUTO: 4 % (ref 0–6)
ERYTHROCYTE [DISTWIDTH] IN BLOOD BY AUTOMATED COUNT: 12.5 % (ref 11.6–15.1)
GFR SERPL CREATININE-BSD FRML MDRD: 104 ML/MIN/1.73SQ M
GLUCOSE P FAST SERPL-MCNC: 85 MG/DL (ref 65–99)
HCT VFR BLD AUTO: 43.2 % (ref 34.8–46.1)
HGB BLD-MCNC: 14.3 G/DL (ref 11.5–15.4)
IMM GRANULOCYTES # BLD AUTO: 0.02 THOUSAND/UL (ref 0–0.2)
IMM GRANULOCYTES NFR BLD AUTO: 0 % (ref 0–2)
LYMPHOCYTES # BLD AUTO: 1.99 THOUSANDS/ÂΜL (ref 0.6–4.47)
LYMPHOCYTES NFR BLD AUTO: 31 % (ref 14–44)
MCH RBC QN AUTO: 29.4 PG (ref 26.8–34.3)
MCHC RBC AUTO-ENTMCNC: 33.1 G/DL (ref 31.4–37.4)
MCV RBC AUTO: 89 FL (ref 82–98)
MONOCYTES # BLD AUTO: 0.57 THOUSAND/ÂΜL (ref 0.17–1.22)
MONOCYTES NFR BLD AUTO: 9 % (ref 4–12)
NEUTROPHILS # BLD AUTO: 3.59 THOUSANDS/ÂΜL (ref 1.85–7.62)
NEUTS SEG NFR BLD AUTO: 55 % (ref 43–75)
NRBC BLD AUTO-RTO: 0 /100 WBCS
PLATELET # BLD AUTO: 271 THOUSANDS/UL (ref 149–390)
PMV BLD AUTO: 10.7 FL (ref 8.9–12.7)
POTASSIUM SERPL-SCNC: 4.5 MMOL/L (ref 3.5–5.3)
PROT SERPL-MCNC: 6.6 G/DL (ref 6.4–8.4)
RBC # BLD AUTO: 4.87 MILLION/UL (ref 3.81–5.12)
SODIUM SERPL-SCNC: 141 MMOL/L (ref 135–147)
WBC # BLD AUTO: 6.52 THOUSAND/UL (ref 4.31–10.16)

## 2024-08-19 PROCEDURE — 85025 COMPLETE CBC W/AUTO DIFF WBC: CPT

## 2024-08-19 PROCEDURE — 80053 COMPREHEN METABOLIC PANEL: CPT

## 2024-08-19 PROCEDURE — 86300 IMMUNOASSAY TUMOR CA 15-3: CPT

## 2024-08-19 PROCEDURE — 36415 COLL VENOUS BLD VENIPUNCTURE: CPT

## 2024-08-20 ENCOUNTER — TELEPHONE (OUTPATIENT)
Age: 49
End: 2024-08-20

## 2024-08-20 LAB — CANCER AG27-29 SERPL-ACNC: 28.1 U/ML (ref 0–38.6)

## 2024-08-20 NOTE — TELEPHONE ENCOUNTER
Received a message that patient would like a call to discuss labs.  Called and spoke with patient's daughter, Usman.  Informed Usman that all labs were WNL.  Usman stated that patient no longer has insurance and is in the process of obtaining new insurance.  Patient is scheduled to see Dr. Cruz on 9/5 and Usman is inquiring if Dr. Cruz can call to discuss.  Usman also inquiring the cost for the f/u visit, should they decide to self pay.  Please call Usman at # [phone]

## 2024-08-21 NOTE — TELEPHONE ENCOUNTER
Left VM for patient's daughter to make her aware of the ofllowing    $111 for a self pay follow up visit.

## 2024-09-09 ENCOUNTER — OFFICE VISIT (OUTPATIENT)
Dept: HEMATOLOGY ONCOLOGY | Facility: CLINIC | Age: 49
End: 2024-09-09

## 2024-09-09 ENCOUNTER — TELEPHONE (OUTPATIENT)
Dept: HEMATOLOGY ONCOLOGY | Facility: CLINIC | Age: 49
End: 2024-09-09

## 2024-09-09 VITALS
OXYGEN SATURATION: 97 % | HEART RATE: 70 BPM | DIASTOLIC BLOOD PRESSURE: 86 MMHG | RESPIRATION RATE: 18 BRPM | SYSTOLIC BLOOD PRESSURE: 122 MMHG | WEIGHT: 156.5 LBS | TEMPERATURE: 97.6 F | BODY MASS INDEX: 25.15 KG/M2 | HEIGHT: 66 IN

## 2024-09-09 DIAGNOSIS — C50.911 MALIGNANT NEOPLASM OF RIGHT BREAST IN FEMALE, ESTROGEN RECEPTOR POSITIVE, UNSPECIFIED SITE OF BREAST (HCC): Primary | ICD-10-CM

## 2024-09-09 DIAGNOSIS — Z15.89 CHEK2-RELATED BREAST CANCER  (HCC): ICD-10-CM

## 2024-09-09 DIAGNOSIS — Z15.09 CHEK2-RELATED BREAST CANCER  (HCC): ICD-10-CM

## 2024-09-09 DIAGNOSIS — C50.919 CHEK2-RELATED BREAST CANCER  (HCC): ICD-10-CM

## 2024-09-09 DIAGNOSIS — K76.89 LIVER CYST: ICD-10-CM

## 2024-09-09 DIAGNOSIS — K76.9 LIVER LESION: ICD-10-CM

## 2024-09-09 DIAGNOSIS — R91.8 PULMONARY NODULES: ICD-10-CM

## 2024-09-09 DIAGNOSIS — C77.9 REGIONAL LYMPH NODE METASTASIS PRESENT (HCC): ICD-10-CM

## 2024-09-09 DIAGNOSIS — F41.9 ANXIETY: ICD-10-CM

## 2024-09-09 DIAGNOSIS — Z15.02 CHEK2-RELATED BREAST CANCER  (HCC): ICD-10-CM

## 2024-09-09 DIAGNOSIS — Z17.0 MALIGNANT NEOPLASM OF RIGHT BREAST IN FEMALE, ESTROGEN RECEPTOR POSITIVE, UNSPECIFIED SITE OF BREAST (HCC): Primary | ICD-10-CM

## 2024-09-09 PROCEDURE — 99214 OFFICE O/P EST MOD 30 MIN: CPT | Performed by: INTERNAL MEDICINE

## 2024-09-09 RX ORDER — LORAZEPAM 0.5 MG/1
0.5 TABLET ORAL EVERY 6 HOURS PRN
Qty: 28 TABLET | Refills: 0 | Status: SHIPPED | OUTPATIENT
Start: 2024-09-09

## 2024-09-09 NOTE — TELEPHONE ENCOUNTER
PT has been scheduled for 6 mon FU with Dr. Cruz, Daughter will be calling to schedule CT after they figure out Ins situation.  Currently pt has no Insurance and daughter is concerned.

## 2024-09-09 NOTE — PROGRESS NOTES
Hematology/Oncology Outpatient Follow-up  Brandi Godfrey 49 y.o. female 1975 [id number]    Date:  9/9/2024      Assessment and Plan:  1. Malignant neoplasm of right breast in female, estrogen receptor positive, unspecified site of breast (HCC)  Again discussed regarding adjuvant hormonal therapy, at this point patient does not want to start any systemic therapy.  Discussed lab results for CA 27.29, patient's daughter is concerned, as it trended up from 12-28.  Will repeat CA 27-29 every 2 months for 2-3 times.  She will call Dr. Cruz if levels are uptrending.    - Cancer antigen 27.29; Future  - CBC and differential; Future  - Comprehensive metabolic panel; Future    2. Regional lymph node metastasis present (HCC)      3. Liver lesion  4. Liver cyst  MRI abdomen from 2/21/2024 reviewed, remarkable for stable hepatic cyst.    - CBC and differential; Future  - Comprehensive metabolic panel; Future    5. CHEK2-related breast cancer  (HCC)  Discussed again with this mutation and risk for other cancers like ovarian cancer, colon cancer.  Patient is up-to-date on colonoscopy, last done in November 2023, when polyps were removed, pathology with hyperplastic or adenocarcinoma.  She had her last Pap smear done in January 2024.     6. Pulmonary nodules  CT chest from February did showed multiple pulmonary nodules, will repeat her CT chest to further evaluate.  Will call patient if CAT scan is concerning.    - CT chest wo contrast; Future    7. Anxiety  Patient reports anxiety, she was getting medications in [place] that help her control her anxiety.  Ativan 0.5 mg as needed ordered.  Offered Narcan, patient declined.    - LORazepam (Ativan) 0.5 mg tablet; Take 1 tablet (0.5 mg total) by mouth every 6 (six) hours as needed for anxiety  Dispense: 28 tablet; Refill: 0     HPI:  Patient is a 49-year-old female, Polish speaking, usually accompanied with daughter who is bilingual.  Patient was following with [facility]  and has recently transitioned her care to [facility].  Per chart review patient found mass in central portion of her right breast in September 2021, she had imaging done that showed 1.4 cm lesion in right breast, had biopsy that showed invasive breast cancer, T1c N0 M0 G1 ER positive, SC positive, HER2 negative, Ki-67 6% and Oncotype 24.  2023, noted with recurrent cancer in right breast, T1c, N1, M0, G1, ER/SC positive, HER2 negative.  Patient is s/p bilateral mastectomies and reconstruction surgery.  She is not interested in adjuvant systemic therapy or radiation therapy.  There had been multiple discussions held for adjuvant hormonal therapy, tamoxifen plus minus ovarian suppression or BSO.  At this time patient does not want to proceed with any systemic therapy.  Given her CHEK2 mutation, discussed with patient regarding risk for cancers, breast cancer, ovarian cancer and colon cancer.  She understands and is up-to-date on her cancer screening.  Had Pap smear done in January of this year, colonoscopy in Novemebr 2023, found polyps but adenomas and hyperplastic.   Oncology History   Malignant neoplasm of right breast in female, estrogen receptor positive (HCC)   2/29/2024 Initial Diagnosis    Malignant neoplasm of right breast in female, estrogen receptor positive (HCC)     4/15/2024 -  Cancer Staged    Staging form: Breast, AJCC 8th Edition  - Pathologic stage from 4/15/2024: Stage IA (rpT1, pN1(sn), cM0, G1, ER+, SC+, HER2-, Oncotype DX score: 24) - Signed by Mike Cruz MD on 4/15/2024  Stage prefix: Recurrence  Method of lymph node assessment: [place] lymph node biopsy  Multigene prognostic tests performed: Oncotype DX  Recurrence score range: Greater than or equal to 11  Histologic grading system: 3 grade system           Interval history:  Patient is seen in office for 6-month follow-up.  Patient is Polish speaking, accompanied by daughter who is bilingual.  Denies any active complaints since last  seen other than being stressed.  Again discussed regarding hormonal therapy, patient does not want to start any additional therapy at this time.  Discussed lab results, CBC, CMP and CA 27-29 level.  Patient's daughter had concerns about CA 27-29 trending up.    ROS: Review of Systems   Constitutional:  Negative for activity change, diaphoresis, fatigue and fever.   Respiratory:  Negative for chest tightness, shortness of breath and wheezing.    Cardiovascular:  Negative for chest pain and palpitations.   Gastrointestinal:  Negative for abdominal pain, blood in stool, constipation, diarrhea and vomiting.   Endocrine: Negative for cold intolerance and heat intolerance.   Musculoskeletal:  Positive for joint swelling. Negative for arthralgias.   Skin:  Negative for color change and rash.   Neurological:  Negative for dizziness, light-headedness and headaches.   Hematological:  Negative for adenopathy.   Psychiatric/Behavioral:  Negative for confusion and suicidal ideas. The patient is nervous/anxious.    All other systems reviewed and are negative.      No past medical history on file.    Past Surgical History:   Procedure Laterality Date    MRI BREAST BIOPSY BILATERAL Bilateral 7/20/2023       Social History     Socioeconomic History    Marital status: /Civil Union     Spouse name: Not on file    Number of children: Not on file    Years of education: Not on file    Highest education level: Not on file   Occupational History    Not on file   Tobacco Use    Smoking status: Not on file    Smokeless tobacco: Not on file   Substance and Sexual Activity    Alcohol use: Not on file    Drug use: Not on file    Sexual activity: Not on file   Other Topics Concern    Not on file   Social History Narrative    Not on file     Social Determinants of Health     Financial Resource Strain: Not on file   Food Insecurity: No Food Insecurity (12/22/2023)    [facility]    Food  "Insecurity     Worried About Running Out of Food in the Last Year: Not on file     Within the past 12 months, the food you bought just didn't last and you didn't have money to get more.: Never true   Transportation Needs: Not on file   Physical Activity: Not on file   Stress: Not on file   Social Connections: Not on file   Intimate Partner Violence: Not on file   Housing Stability: Not on file       No family history on file.    No Known Allergies      Current Outpatient Medications:     acetaminophen (TYLENOL) 325 mg tablet, Take 325 mg by mouth (Patient not taking: Reported on 1/18/2024), Disp: , Rfl:     bisacodyl (DULCOLAX) 5 mg EC tablet, Take 5 mg by mouth (Patient not taking: Reported on 1/18/2024), Disp: , Rfl:     Na Sulfate-K Sulfate-Mg Sulf 17.5-3.13-1.6 GM/177ML SOLN, Take as directed (Patient not taking: Reported on 1/18/2024), Disp: , Rfl:     ondansetron (ZOFRAN) 4 mg tablet, Take 4 mg by mouth (Patient not taking: Reported on 1/18/2024), Disp: , Rfl:     oxyCODONE (ROXICODONE) 5 immediate release tablet, Take 5 mg by mouth (Patient not taking: Reported on 1/18/2024), Disp: , Rfl:       Physical Exam:  /86 (BP Location: Left arm, Patient Position: Sitting, Cuff Size: Adult)   Pulse 70   Temp 97.6 °F (36.4 °C) (Temporal)   Resp 18   Ht 5' 6\" (1.676 m)   Wt 71 kg (156 lb 8 oz)   SpO2 97%   BMI 25.26 kg/m²     Physical Exam  Constitutional:       Appearance: Normal appearance.   HENT:      Head: Normocephalic and atraumatic.      Nose: Nose normal.      Mouth/Throat:      Mouth: Mucous membranes are moist.      Pharynx: Oropharynx is clear.   Eyes:      Conjunctiva/sclera: Conjunctivae normal.      Pupils: Pupils are equal, round, and reactive to light.   Cardiovascular:      Rate and Rhythm: Normal rate and regular rhythm.      Pulses: Normal pulses.   Abdominal:      General: Abdomen is flat. Bowel sounds are normal.   Musculoskeletal:         General: Normal range of motion.   Skin:     " General: Skin is warm and dry.   Neurological:      Mental Status: She is alert and oriented to person, place, and time. Mental status is at baseline.           Labs:  Lab Results   Component Value Date    WBC 6.52 08/19/2024    HGB 14.3 08/19/2024    HCT 43.2 08/19/2024    MCV 89 08/19/2024     08/19/2024            Patient voiced understanding and agreement in the above discussion. Aware to contact our office with questions/symptoms in the interim.     This note has been generated by voice recognition software system.  Therefore, there may be spelling, grammar, and or syntax errors. Please contact if questions arise.

## 2024-10-01 ENCOUNTER — TELEPHONE (OUTPATIENT)
Dept: HEMATOLOGY ONCOLOGY | Facility: CLINIC | Age: 49
End: 2024-10-01

## 2024-10-01 NOTE — TELEPHONE ENCOUNTER
A call was placed to Brandi, reached her voice mail. A message was left that tests needed to be scheduled, I will call at another time.

## 2024-10-02 ENCOUNTER — TELEPHONE (OUTPATIENT)
Dept: HEMATOLOGY ONCOLOGY | Facility: CLINIC | Age: 49
End: 2024-10-02

## 2024-10-02 NOTE — TELEPHONE ENCOUNTER
A call was placed to Usman/daughter, reached her voice mail, a message was left. Dr Cruz is requesting a Ct chest and a mammo be scheduled. The office understands there are concerns regarding cost due to pt not having insurance. Central scheduling number was provided.

## 2024-10-03 DIAGNOSIS — Z17.0 MALIGNANT NEOPLASM OF RIGHT BREAST IN FEMALE, ESTROGEN RECEPTOR POSITIVE, UNSPECIFIED SITE OF BREAST (HCC): Primary | ICD-10-CM

## 2024-10-03 DIAGNOSIS — C50.911 MALIGNANT NEOPLASM OF RIGHT BREAST IN FEMALE, ESTROGEN RECEPTOR POSITIVE, UNSPECIFIED SITE OF BREAST (HCC): Primary | ICD-10-CM

## 2024-10-08 ENCOUNTER — PATIENT OUTREACH (OUTPATIENT)
Dept: CASE MANAGEMENT | Facility: HOSPITAL | Age: 49
End: 2024-10-08

## 2024-10-08 NOTE — PROGRESS NOTES
Biopsychosocial and Barriers Assessment    Type of Cancer: Breast Cancer  Treatment plan: pt had a mastectomy and needs scans  Noted barriers to care: currently has no insurance  Cultural/Church concerns: Pt does not speak English and she prefers to go through her Daughter for communication  Hair Loss/ Wig resources needed: N/A    DT completed: yes  DT score: 3  Issues noted: worry, changes in appearance, insurance and work    Marital status/Lives with: , lives with spouse, son and daughter  Pt's support system: son, daughter, spouse  Mental Health history: None reported  Substance Abuse: None reported    Employment/income source: Pt is looking for employment  Concerns with bills (treatment vs household): Pt is mostly concerned for her medical insurance  Noted issues with home: None reported    Narrative note:     LSW received referral from the oncology office due to pt being uninsured.  LSW made outreach to daughter, as per chart, and she was open and receptive of this call.  Daughter explained that the pt was living and working in [place], along with her spouse.  They moved to [place] to be with their daughter after the pt had a double mastectomy in August.  The pt stopped working and the spouse left his job due to the move.  He has not yet been able to identity employment.  The pt is feeling well enough to work, however due to only speaking Polish, she has not been able to find a job as of yet.  The daughter states that they have too much money and did not qualify for Medicaid and they do not have enough money to purchase a commercial insurance plan through [employer].  This has prevented the pt from having any follow up treatment and testing.  LSW provided information on the BCCPT program and explained that the [employer] are much more versed in this area.  Pt accepted a referral to the FA.  She states that they have no other social work needs, that the insurance is their only concern.  LSW provided contact  number and encouraged the daughter to reach out if they had any other needs moving forward.  Support was offered.

## 2024-10-09 ENCOUNTER — TELEPHONE (OUTPATIENT)
Dept: HEMATOLOGY ONCOLOGY | Facility: CLINIC | Age: 49
End: 2024-10-09

## 2024-10-09 ENCOUNTER — DOCUMENTATION (OUTPATIENT)
Dept: HEMATOLOGY ONCOLOGY | Facility: CLINIC | Age: 49
End: 2024-10-09

## 2024-10-09 NOTE — TELEPHONE ENCOUNTER
left voicemail for PT daughter, Usman, on 10/9/24 @ 10:15 AM for introductions and to obtain information on PT to apply her for BCCPT program ([place]) if she so chooses to pursue. Writer strongly encouraged a call back from Usman to further discuss the application and ideally, pursue this for PT. Writer provided all contact information to Usman for call back and future use. Writer will set reminder to outreach again on 10/11/24 if not heard back from prior.    *Writer did email PT RN to obtain PCP signatures in a proactive attempt should the PT elect to pursue BCCPT application.*              Wilfrido Dickerson  Phone:[phone]  Email:[email or web address]

## 2024-10-09 NOTE — PROGRESS NOTES
received all signed documentation pieces from both the PT and physician on 10/9/24.  has officially submitted the PT BCCPT application to [employer] via email on 10/9/24 @ 3:15 PM to begin the review process. Alfredr informed PT daughter, Gilda, of this submission. Alfredr informed Gilda that if he acquires any updates, he will reach out to her accordingly.            Wilfrido Dickerson  Phone:[phone]  Email:[email or web address]

## 2024-10-09 NOTE — TELEPHONE ENCOUNTER
PT daughter, Gilda, called writer back on 10/9/24 @ 12:45 PM to go over PT current financial status. Writer informed Gilda that he feels the Pt is a strong canidate for the BCCPT program in attempt to obtain the PT [place]. Writer informed Gilda that to proceed with this application he would need the PT signature and some household information. Jam[employer] was able to provide the household information to writer and requested the writer send over the document via email for PT to sign. Writer confirmed he would do so and work on obtaining the prescriber signature on his end. Gilda was grateful for the support. Writer provided all contact information to Jamestamra for future use.          Wilfrido Dickerson  Phone:[phone]  Email:[email or web address]

## 2024-10-18 ENCOUNTER — APPOINTMENT (OUTPATIENT)
Dept: LAB | Facility: CLINIC | Age: 49
End: 2024-10-18

## 2024-10-18 DIAGNOSIS — C50.911 MALIGNANT NEOPLASM OF RIGHT BREAST IN FEMALE, ESTROGEN RECEPTOR POSITIVE, UNSPECIFIED SITE OF BREAST (HCC): ICD-10-CM

## 2024-10-18 DIAGNOSIS — Z17.0 MALIGNANT NEOPLASM OF RIGHT BREAST IN FEMALE, ESTROGEN RECEPTOR POSITIVE, UNSPECIFIED SITE OF BREAST (HCC): ICD-10-CM

## 2024-10-18 DIAGNOSIS — K76.89 LIVER CYST: ICD-10-CM

## 2024-10-18 LAB
ALBUMIN SERPL BCG-MCNC: 4.2 G/DL (ref 3.5–5)
ALP SERPL-CCNC: 38 U/L (ref 34–104)
ALT SERPL W P-5'-P-CCNC: 12 U/L (ref 7–52)
ANION GAP SERPL CALCULATED.3IONS-SCNC: 13 MMOL/L (ref 4–13)
AST SERPL W P-5'-P-CCNC: 21 U/L (ref 13–39)
BASOPHILS # BLD AUTO: 0.04 THOUSANDS/ΜL (ref 0–0.1)
BASOPHILS NFR BLD AUTO: 1 % (ref 0–1)
BILIRUB SERPL-MCNC: 0.83 MG/DL (ref 0.2–1)
BUN SERPL-MCNC: 14 MG/DL (ref 5–25)
CALCIUM SERPL-MCNC: 9 MG/DL (ref 8.4–10.2)
CHLORIDE SERPL-SCNC: 108 MMOL/L (ref 96–108)
CO2 SERPL-SCNC: 21 MMOL/L (ref 21–32)
CREAT SERPL-MCNC: 0.76 MG/DL (ref 0.6–1.3)
EOSINOPHIL # BLD AUTO: 0.16 THOUSAND/ΜL (ref 0–0.61)
EOSINOPHIL NFR BLD AUTO: 3 % (ref 0–6)
ERYTHROCYTE [DISTWIDTH] IN BLOOD BY AUTOMATED COUNT: 12.5 % (ref 11.6–15.1)
GFR SERPL CREATININE-BSD FRML MDRD: 93 ML/MIN/1.73SQ M
GLUCOSE P FAST SERPL-MCNC: 100 MG/DL (ref 65–99)
HCT VFR BLD AUTO: 43.7 % (ref 34.8–46.1)
HGB BLD-MCNC: 14.5 G/DL (ref 11.5–15.4)
IMM GRANULOCYTES # BLD AUTO: 0.01 THOUSAND/UL (ref 0–0.2)
IMM GRANULOCYTES NFR BLD AUTO: 0 % (ref 0–2)
LYMPHOCYTES # BLD AUTO: 1.79 THOUSANDS/ΜL (ref 0.6–4.47)
LYMPHOCYTES NFR BLD AUTO: 37 % (ref 14–44)
MCH RBC QN AUTO: 28.9 PG (ref 26.8–34.3)
MCHC RBC AUTO-ENTMCNC: 33.2 G/DL (ref 31.4–37.4)
MCV RBC AUTO: 87 FL (ref 82–98)
MONOCYTES # BLD AUTO: 0.46 THOUSAND/ΜL (ref 0.17–1.22)
MONOCYTES NFR BLD AUTO: 9 % (ref 4–12)
NEUTROPHILS # BLD AUTO: 2.45 THOUSANDS/ΜL (ref 1.85–7.62)
NEUTS SEG NFR BLD AUTO: 50 % (ref 43–75)
NRBC BLD AUTO-RTO: 0 /100 WBCS
PLATELET # BLD AUTO: 218 THOUSANDS/UL (ref 149–390)
PMV BLD AUTO: 10.9 FL (ref 8.9–12.7)
POTASSIUM SERPL-SCNC: 5.3 MMOL/L (ref 3.5–5.3)
PROT SERPL-MCNC: 7 G/DL (ref 6.4–8.4)
RBC # BLD AUTO: 5.01 MILLION/UL (ref 3.81–5.12)
SODIUM SERPL-SCNC: 142 MMOL/L (ref 135–147)
WBC # BLD AUTO: 4.91 THOUSAND/UL (ref 4.31–10.16)

## 2024-10-18 PROCEDURE — 85025 COMPLETE CBC W/AUTO DIFF WBC: CPT

## 2024-10-18 PROCEDURE — 36415 COLL VENOUS BLD VENIPUNCTURE: CPT

## 2024-10-18 PROCEDURE — 86300 IMMUNOASSAY TUMOR CA 15-3: CPT

## 2024-10-18 PROCEDURE — 80053 COMPREHEN METABOLIC PANEL: CPT

## 2024-10-19 LAB — CANCER AG27-29 SERPL-ACNC: 24.4 U/ML (ref 0–38.6)

## 2024-11-21 ENCOUNTER — HOSPITAL ENCOUNTER (OUTPATIENT)
Dept: CT IMAGING | Facility: HOSPITAL | Age: 49
End: 2024-11-21
Payer: COMMERCIAL

## 2024-11-21 DIAGNOSIS — R91.8 PULMONARY NODULES: ICD-10-CM

## 2024-11-21 DIAGNOSIS — F41.9 ANXIETY: ICD-10-CM

## 2024-11-21 PROCEDURE — 71250 CT THORAX DX C-: CPT

## 2024-11-21 NOTE — TELEPHONE ENCOUNTER
1 [id number] 09/09/2024 09/09/2024 LORazepam (Tablet) 28.0 7 0.5 MG NA SHAUN PROOTHI [facility], [facility] Commercial Insurance 0 / 0 [place]

## 2024-11-28 RX ORDER — LORAZEPAM 0.5 MG/1
0.5 TABLET ORAL EVERY 6 HOURS PRN
Qty: 28 TABLET | Refills: 0 | Status: SHIPPED | OUTPATIENT
Start: 2024-11-28

## 2024-12-01 ENCOUNTER — DOCUMENTATION (OUTPATIENT)
Dept: HEMATOLOGY ONCOLOGY | Facility: CLINIC | Age: 49
End: 2024-12-01

## 2024-12-01 DIAGNOSIS — Z15.02 CHEK2-RELATED BREAST CANCER  (HCC): ICD-10-CM

## 2024-12-01 DIAGNOSIS — R91.8 PULMONARY NODULES: ICD-10-CM

## 2024-12-01 DIAGNOSIS — F41.9 ANXIETY: ICD-10-CM

## 2024-12-01 DIAGNOSIS — C50.911 MALIGNANT NEOPLASM OF RIGHT BREAST IN FEMALE, ESTROGEN RECEPTOR POSITIVE, UNSPECIFIED SITE OF BREAST (HCC): Primary | ICD-10-CM

## 2024-12-01 DIAGNOSIS — Z15.09 CHEK2-RELATED BREAST CANCER  (HCC): ICD-10-CM

## 2024-12-01 DIAGNOSIS — Z15.89 CHEK2-RELATED BREAST CANCER  (HCC): ICD-10-CM

## 2024-12-01 DIAGNOSIS — K76.9 LIVER LESION: ICD-10-CM

## 2024-12-01 DIAGNOSIS — C77.9 REGIONAL LYMPH NODE METASTASIS PRESENT (HCC): ICD-10-CM

## 2024-12-01 DIAGNOSIS — Z17.0 MALIGNANT NEOPLASM OF RIGHT BREAST IN FEMALE, ESTROGEN RECEPTOR POSITIVE, UNSPECIFIED SITE OF BREAST (HCC): Primary | ICD-10-CM

## 2024-12-01 DIAGNOSIS — K76.89 LIVER CYST: ICD-10-CM

## 2024-12-01 DIAGNOSIS — C50.919 CHEK2-RELATED BREAST CANCER  (HCC): ICD-10-CM

## 2024-12-01 NOTE — PROGRESS NOTES
I have requested my JUAN Alvarez to schedule patient's mammography that had been ordered, blood work prior to MRI abdomen in February 2025.  Orders are in the epic.

## 2024-12-27 ENCOUNTER — TELEPHONE (OUTPATIENT)
Dept: MAMMOGRAPHY | Facility: CLINIC | Age: 49
End: 2024-12-27

## 2024-12-27 ENCOUNTER — HOSPITAL ENCOUNTER (OUTPATIENT)
Dept: MAMMOGRAPHY | Facility: CLINIC | Age: 49
End: 2024-12-27
Payer: COMMERCIAL

## 2024-12-27 ENCOUNTER — HOSPITAL ENCOUNTER (OUTPATIENT)
Dept: ULTRASOUND IMAGING | Facility: CLINIC | Age: 49
End: 2024-12-27
Payer: COMMERCIAL

## 2024-12-27 ENCOUNTER — TELEPHONE (OUTPATIENT)
Age: 49
End: 2024-12-27

## 2024-12-27 DIAGNOSIS — Z15.02 CHEK2-RELATED BREAST CANCER  (HCC): ICD-10-CM

## 2024-12-27 DIAGNOSIS — Z15.09 CHEK2-RELATED BREAST CANCER  (HCC): ICD-10-CM

## 2024-12-27 DIAGNOSIS — Z15.89 CHEK2-RELATED BREAST CANCER  (HCC): ICD-10-CM

## 2024-12-27 DIAGNOSIS — R92.8 ABNORMAL MAMMOGRAM: ICD-10-CM

## 2024-12-27 DIAGNOSIS — C50.919 CHEK2-RELATED BREAST CANCER  (HCC): ICD-10-CM

## 2024-12-27 DIAGNOSIS — Z17.0 MALIGNANT NEOPLASM OF RIGHT BREAST IN FEMALE, ESTROGEN RECEPTOR POSITIVE, UNSPECIFIED SITE OF BREAST (HCC): ICD-10-CM

## 2024-12-27 DIAGNOSIS — C50.911 MALIGNANT NEOPLASM OF RIGHT BREAST IN FEMALE, ESTROGEN RECEPTOR POSITIVE, UNSPECIFIED SITE OF BREAST (HCC): ICD-10-CM

## 2024-12-27 PROCEDURE — G0279 TOMOSYNTHESIS, MAMMO: HCPCS

## 2024-12-27 PROCEDURE — 77066 DX MAMMO INCL CAD BI: CPT

## 2024-12-27 PROCEDURE — 76642 ULTRASOUND BREAST LIMITED: CPT

## 2024-12-27 NOTE — TELEPHONE ENCOUNTER
Linda from [facility] called in wanting to inform Dr. Cruz that pt has declined right breast ultrasound biopsy. Pt would like to wait to see Dr. Cruz for their 3/10/25 appt which the concern is that the appt is far out.

## 2024-12-30 ENCOUNTER — TELEPHONE (OUTPATIENT)
Dept: HEMATOLOGY ONCOLOGY | Facility: CLINIC | Age: 49
End: 2024-12-30

## 2024-12-30 NOTE — TELEPHONE ENCOUNTER
Left message for patient advising we were rescheduling her followup from march as she was refusing the Biopsy as recommended from [facility].  New Appointment time is 1/23 at 10:00am.  Advised patient to return call if this time will not work with her schedule.  [employer] number provided

## 2025-01-01 DIAGNOSIS — F41.9 ANXIETY: ICD-10-CM

## 2025-01-02 NOTE — TELEPHONE ENCOUNTER
Refill must be reviewed and completed by the office or provider. The refill is unable to be approved or denied by the medication management team.      Patient Id Prescription # Filled Written Drug Label Qty Days Strength MME** Prescriber Pharmacy Payment REFILL #/Auth State Detail   1 [id number] 11/29/2024 11/28/2024 LORazepam (Tablet) 28.0 7 0.5 MG NA SHAUN PROOTHI [facility], [facility] Commercial [place] 0 / 0 [place]    1 [id number] 09/09/2024 09/09/2024 LORazepam (Tablet) 28.0 7 0.5 MG NA SHAUN PROOTHI [facility], [facility] Commercial [place] 0 / 0 [place]

## 2025-01-06 ENCOUNTER — TELEPHONE (OUTPATIENT)
Dept: HEMATOLOGY ONCOLOGY | Facility: CLINIC | Age: 50
End: 2025-01-06

## 2025-01-06 RX ORDER — LORAZEPAM 0.5 MG/1
0.5 TABLET ORAL EVERY 6 HOURS PRN
Qty: 28 TABLET | Refills: 0 | Status: SHIPPED | OUTPATIENT
Start: 2025-01-06

## 2025-01-06 NOTE — TELEPHONE ENCOUNTER
I spoke with patient's daughter Karoline and she said no Narcan.  She also mentioned that patient has appointment to have ultrasound-guided biopsy of the right breast abnormality.

## 2025-01-23 ENCOUNTER — OFFICE VISIT (OUTPATIENT)
Dept: HEMATOLOGY ONCOLOGY | Facility: CLINIC | Age: 50
End: 2025-01-23
Payer: COMMERCIAL

## 2025-01-23 ENCOUNTER — DOCUMENTATION (OUTPATIENT)
Dept: HEMATOLOGY ONCOLOGY | Facility: CLINIC | Age: 50
End: 2025-01-23

## 2025-01-23 VITALS
SYSTOLIC BLOOD PRESSURE: 120 MMHG | HEART RATE: 67 BPM | RESPIRATION RATE: 18 BRPM | BODY MASS INDEX: 24.35 KG/M2 | OXYGEN SATURATION: 98 % | HEIGHT: 66 IN | DIASTOLIC BLOOD PRESSURE: 80 MMHG | WEIGHT: 151.5 LBS | TEMPERATURE: 97.3 F

## 2025-01-23 DIAGNOSIS — Z15.02 CHEK2-RELATED BREAST CANCER  (HCC): ICD-10-CM

## 2025-01-23 DIAGNOSIS — C50.911 MALIGNANT NEOPLASM OF RIGHT BREAST IN FEMALE, ESTROGEN RECEPTOR POSITIVE, UNSPECIFIED SITE OF BREAST (HCC): Primary | ICD-10-CM

## 2025-01-23 DIAGNOSIS — Z17.0 MALIGNANT NEOPLASM OF RIGHT BREAST IN FEMALE, ESTROGEN RECEPTOR POSITIVE, UNSPECIFIED SITE OF BREAST (HCC): Primary | ICD-10-CM

## 2025-01-23 DIAGNOSIS — K76.89 LIVER CYST: ICD-10-CM

## 2025-01-23 DIAGNOSIS — R91.8 PULMONARY NODULES: ICD-10-CM

## 2025-01-23 DIAGNOSIS — K76.9 LIVER LESION: ICD-10-CM

## 2025-01-23 DIAGNOSIS — Z15.09 CHEK2-RELATED BREAST CANCER  (HCC): ICD-10-CM

## 2025-01-23 DIAGNOSIS — Z15.89 CHEK2-RELATED BREAST CANCER  (HCC): ICD-10-CM

## 2025-01-23 DIAGNOSIS — F41.9 ANXIETY: ICD-10-CM

## 2025-01-23 DIAGNOSIS — C77.9 REGIONAL LYMPH NODE METASTASIS PRESENT (HCC): ICD-10-CM

## 2025-01-23 DIAGNOSIS — C50.919 CHEK2-RELATED BREAST CANCER  (HCC): ICD-10-CM

## 2025-01-23 PROCEDURE — 99215 OFFICE O/P EST HI 40 MIN: CPT | Performed by: INTERNAL MEDICINE

## 2025-01-23 NOTE — ASSESSMENT & PLAN NOTE
On mammography and ultrasound of the right breast patient has suspicious mass in the right breast and right axilla and she needs ultrasound-guided needle biopsies but she does not want to go for any biopsy at this time.  She wants these studies to be repeated in few months back in June 2025.  Patient is very definite about that.  She speaks Polish.  She is here with her daughter and daughter is bilingual and she translated and interpreted for her.  Orders:  •  Mammo diagnostic right w 3d and cad; Future  •  US breast right limited (diagnostic); Future  •  US breast axilla right; Future  •  CBC and differential; Future  •  Comprehensive metabolic panel; Future  •  Cancer antigen 27.29; Future  •  CT chest wo contrast; Future  •  MRI abdomen w wo contrast; Future

## 2025-01-23 NOTE — ASSESSMENT & PLAN NOTE
On mammography and ultrasound of the right breast patient has suspicious mass in the right breast and right axilla and she needs ultrasound-guided needle biopsies but she does not want to go for any biopsy at this time.  She wants these studies to be repeated in few months back in June 2025.  Patient is very definite about that.  She speaks Polish.  She is here with her daughter and daughter is bilingual and she translated and interpreted for her.  Orders:  •  Mammo diagnostic right w 3d and cad; Future  •  US breast right limited (diagnostic); Future  •  US breast axilla right; Future

## 2025-01-23 NOTE — PROGRESS NOTES
Name: Brandi Godfrey      : 1975      MRN: [id number]  Encounter Provider: Mike Cruz MD  Encounter Date: 2025   Encounter department: [facility] [place]  :  Assessment & Plan  Malignant neoplasm of right breast in female, estrogen receptor positive, unspecified site of breast (HCC)  On mammography and ultrasound of the right breast patient has suspicious mass in the right breast and right axilla and she needs ultrasound-guided needle biopsies but she does not want to go for any biopsy at this time.  She wants these studies to be repeated in few months back in 2025.  Patient is very definite about that.  She speaks Polish.  She is here with her daughter and daughter is bilingual and she translated and interpreted for her.  Orders:  •  Mammo diagnostic right w 3d and cad; Future  •  US breast right limited (diagnostic); Future  •  US breast axilla right; Future  •  CBC and differential; Future  •  Comprehensive metabolic panel; Future  •  Cancer antigen 27.29; Future  •  CT chest wo contrast; Future  •  MRI abdomen w wo contrast; Future    Regional lymph node metastasis present (HCC)  On mammography and ultrasound of the right breast patient has suspicious mass in the right breast and right axilla and she needs ultrasound-guided needle biopsies but she does not want to go for any biopsy at this time.  She wants these studies to be repeated in few months back in 2025.  Patient is very definite about that.  She speaks Polish.  She is here with her daughter and daughter is bilingual and she translated and interpreted for her.  Orders:  •  Mammo diagnostic right w 3d and cad; Future  •  US breast right limited (diagnostic); Future  •  US breast axilla right; Future    Liver lesion  Likely small cysts  Orders:  •  MRI abdomen w wo contrast; Future    Liver cyst  Likely small cysts  Orders:  •  MRI abdomen w wo contrast; Future    CHEK2-related breast cancer   (HCC)  Patient has been aware of this mutation and cancer risks.  She had EGD and colonoscopy in 2023       Pulmonary nodules  Being monitored  Orders:  •  CT chest wo contrast; Future    Anxiety  Infrequently she uses lorazepam       Ordered blood work, right mammography, ultrasound right breast and CT chest, all of these around Silva 10 and MRI abdomen a week later.  Follow-up in 5 months.  On mammography and ultrasound of the right breast patient has suspicious mass in the right breast and right axilla and she needs ultrasound-guided needle biopsies but she does not want to go for any biopsy at this time.  She wants these studies to be repeated in few months back in June 2025.  Patient is very definite about that.  She speaks Polish.  She is here with her daughter and daughter is bilingual and she translated and interpreted for her.  I could not convince her for breast biopsy.  Discussed the importance of self breast examination, eating healthy foods, staying active and health screening test.  Patient is capable of self-care.  Goal is to find out more about the mass in right breast and right axilla if and when patient is allowed.  Patient will continue to follow with her primary physician and other consultants.  Provided counseling and support.  I used a dictation device to dictate this note and there could be mistakes in my note and for that patient may contact my office.    History of Present Illness   Chief Complaint   Patient presents with   • Follow-up    Patient speaks Polish.  She is here with her daughter and patient's daughter is bilingual.  Daughter translated and interpreted for her.  Patient is premenopausal.  Right breast mass was first found in the central portion of her right breast in September 2021 , 1.4 cm lesion   on imaging studies and she had biopsy that showed invasive breast cancer, ER positive, DC positive and HER2 negative.  She had MRI scan of the breasts and biopsy of left breast that  did not show malignancy.  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  Patient had bilateral lumpectomies and final report was 2 cm invasive ductal carcinoma, no lymphovascular invasion and negative sentinel lymph nodes.  ER positive.  OH positive.  HER2 negative.  Ki-67 6%.  Oncotype score was 24.  No family history of breast or ovarian cancer.  Patient's mother had pleural malignancy.  Menarche at age 11 and first pregnancy at age 23.  Stage was 1A (T1c N0 M0 G1 ER positive, OH positive, HER2 negative and Oncotype 24)  No other significant past history other than peptic ulcer disease and previous history of lumpectomy.  Former smoker, 10 cigarettes a day for about 20 years and she quit smoking in 2021.  No alcohol.  Patient decided against adjuvant therapy, chemotherapy and hormonal therapy.  She wanted to try alternative/natural therapy.  For CHEK2 mutation she was encouraged to have breast imaging studies, GYN examination and colonoscopy.  Patient had  colonoscopy and there was no malignancy.  Colonoscopy  showed polyps.  Patient saw her gynecologist  in [place] for GYN examination and breast examination.  She follows with her gynecologist in [place].  On 9/21/2023 patient had right breast lumpectomy and sentinel lymph node sampling and report was invasive carcinoma of no special type, grade 1, 1.2 cm tumor, 1 of 3 positive lymph node for metastatic disease, ER 99%, OH 99%, HER2 negative (1+).  Again patient decided not to have radiation or adjuvant therapy.  Patient wanted to try alternative therapy/natural therapy and she still feels the same way.  She wanted to have scans.  Recently she had CT scan and bone scan and also MRI scan of the abdomen because of liver lesion and there was no evidence of malignancy/metastatic disease.   The last time patient took information home  on tamoxifen.  Patient had decided not to have any adjuvant therapy for breast cancer.  She is not interested in ovarian suppression or  "BSO.  No symptoms at present other than anxiety.  Oncology History   Cancer Staging   Malignant neoplasm of right breast in female, estrogen receptor positive (HCC)  Staging form: Breast, AJCC 8th Edition  - Pathologic stage from 4/15/2024: Stage IA (rpT1, pN1(sn), cM0, G1, ER+, MO+, HER2-, Oncotype DX score: 24) - Signed by Mike Cruz MD on 4/15/2024  Stage prefix: Recurrence  Method of lymph node assessment: [place] lymph node biopsy  Multigene prognostic tests performed: Oncotype DX  Recurrence score range: Greater than or equal to 11  Histologic grading system: 3 grade system  Oncology History   Malignant neoplasm of right breast in female, estrogen receptor positive (HCC)   2/29/2024 Initial Diagnosis    Malignant neoplasm of right breast in female, estrogen receptor positive (HCC)     4/15/2024 -  Cancer Staged    Staging form: Breast, AJCC 8th Edition  - Pathologic stage from 4/15/2024: Stage IA (rpT1, pN1(sn), cM0, G1, ER+, MO+, HER2-, Oncotype DX score: 24) - Signed by Mike Cruz MD on 4/15/2024  Stage prefix: Recurrence  Method of lymph node assessment: [place] lymph node biopsy  Multigene prognostic tests performed: Oncotype DX  Recurrence score range: Greater than or equal to 11  Histologic grading system: 3 grade system          Pertinent Medical History   1/23/2025:   See details in HPI      Review of Systems  Reviewed 12 systems.  Symptoms are as in HPI and she has anxiety.  No no fevers, chills, bleeding, bone pains, skin rash, weight loss, night sweats, melena or hematuria and no swelling of the ankles and no swollen glands.  No other neurological, cardiac, pulmonary, GI and  symptoms.  No arthritis or tiredness.      Objective   /80 (BP Location: Left arm, Patient Position: Sitting, Cuff Size: Adult)   Pulse 67   Temp (!) 97.3 °F (36.3 °C) (Temporal)   Resp 18   Ht 5' 6\" (1.676 m)   Wt 68.7 kg (151 lb 8 oz)   LMP 12/20/2024 (Approximate)   SpO2 98%   BMI 24.45 kg/m² "     Pain Screening:  Pain Score: 0-No pain  ECOG   0  Physical Exam  Vitals reviewed.   Constitutional:       General: She is not in acute distress.     Appearance: Normal appearance. She is not ill-appearing.   HENT:      Head: Normocephalic and atraumatic.      Mouth/Throat:      Comments: No oral thrush.  Eyes:      General: No scleral icterus.  Cardiovascular:      Rate and Rhythm: Normal rate and regular rhythm.      Heart sounds: Normal heart sounds. No murmur heard.  Pulmonary:      Effort: Pulmonary effort is normal. No respiratory distress.      Breath sounds: Normal breath sounds. No rhonchi or rales.   Abdominal:      General: Abdomen is flat. There is no distension.      Palpations: Abdomen is soft. There is no mass.      Tenderness: There is no abdominal tenderness.      Comments: No ascites.   Musculoskeletal:         General: Normal range of motion.      Cervical back: Normal range of motion. No rigidity.      Right lower leg: No edema.      Left lower leg: No edema.      Comments: No lymphedema.  No palpable lymph node in the neck and axillary areas.  No calf tenderness.  No clubbing.   Lymphadenopathy:      Cervical: No cervical adenopathy.   Skin:     Coloration: Skin is not jaundiced or pale.      Findings: No bruising or rash.   Neurological:      General: No focal deficit present.      Mental Status: She is alert and oriented to person, place, and time.      Motor: No weakness.      Coordination: Coordination normal.      Gait: Gait normal.   Psychiatric:         Behavior: Behavior normal.      Comments: Anxious.         Labs: I have reviewed the following labs:  Lab Results   Component Value Date/Time    WBC 4.91 10/18/2024 10:36 AM    RBC 5.01 10/18/2024 10:36 AM    Hemoglobin 14.5 10/18/2024 10:36 AM    Hematocrit 43.7 10/18/2024 10:36 AM    MCV 87 10/18/2024 10:36 AM    MCH 28.9 10/18/2024 10:36 AM    RDW 12.5 10/18/2024 10:36 AM    Platelets 218 10/18/2024 10:36 AM    Segmented % 50  10/18/2024 10:36 AM    Lymphocytes % 37 10/18/2024 10:36 AM    Monocytes % 9 10/18/2024 10:36 AM    Eosinophils Relative 3 10/18/2024 10:36 AM    Basophils Relative 1 10/18/2024 10:36 AM    Immature Grans % 0 10/18/2024 10:36 AM    Absolute Neutrophils 2.45 10/18/2024 10:36 AM     Lab Results   Component Value Date/Time    Potassium 5.3 10/18/2024 10:36 AM    Chloride 108 10/18/2024 10:36 AM    CO2 21 10/18/2024 10:36 AM    BUN 14 10/18/2024 10:36 AM    Creatinine 0.76 10/18/2024 10:36 AM    Glucose, Fasting 100 (H) 10/18/2024 10:36 AM    Calcium 9.0 10/18/2024 10:36 AM    AST 21 10/18/2024 10:36 AM    ALT 12 10/18/2024 10:36 AM    Alkaline Phosphatase 38 10/18/2024 10:36 AM    Total Protein 7.0 10/18/2024 10:36 AM    Albumin 4.2 10/18/2024 10:36 AM    Total Bilirubin 0.83 10/18/2024 10:36 AM    eGFR 93 10/18/2024 10:36 AM             Component  Ref Range & Units (hover) 10/18/24 10:36 AM 8/19/24 11:16 AM 2/6/24  1:07 PM   CA 27-29 24.4 28.1 CM 12.0 CM   Comment: Siemens [employer]rip[employer] Immunochemiluminometric Methodology (ICMA)      Mammo diagnostic bilateral w 3d and cad  US breast right limited (diagnostic)  Status: Final result     PACS Images     Show images for Mammo diagnostic bilateral w 3d and cad  Study Result    Narrative & Impression   DIAGNOSIS: Malignant neoplasm of right breast in female, estrogen receptor positive, unspecified site of breast (HCC); CHEK2-related breast cancer  (HCC)      TECHNIQUE:   Digital diagnostic mammography was performed. Computer Aided Detection (CAD) analyzed all applicable images.  Right breast ultrasound was performed.      COMPARISONS: Prior breast imaging dated: 09/15/2023, 09/15/2023, 07/20/2023, 07/20/2023, 07/20/2023, 06/05/2023, 06/05/2023, 12/06/2021, 12/06/2021, 12/06/2021, 12/06/2021, 12/06/2021, 12/06/2021, 11/24/2021, and 10/07/2021     RELEVANT HISTORY:   Family Breast Cancer History: No known family history of breast cancer.  Family Medical History: No known relevant  family medical history.   Personal History: No known relevant hormone history. No known relevant surgical history. Medical history includes breast cancer.     RISK ASSESSMENT:   Tyrer-Cuzick risk assessment reporting was suppressed due to the patient's history and/or demographic factors.     TISSUE DENSITY:   There are scattered areas of fibroglandular density.     INDICATION: Brandi Godfrey is a 49 y.o. female presenting for annual evaluation.  Patient is status post bilateral mastectomy with TRAM flap reconstruction.     FINDINGS:   RIGHT  1) MASS [A]  Mammo diagnostic bilateral w 3d and cad: There are postsurgical changes related to prior mastectomy with flap reconstruction.  There is a mass with spiculated margins seen in the upper inner quadrant of the right breast at 2 o'clock in the middle depth.   US breast right limited (diagnostic): Targeted ultrasound of the upper inner quadrant was performed.  At the 2:00 axis, 4 cm from the nipple, just deep to the skin is a spiculated hypodense mass measuring 5 x 4 x 6 mm.  Surgical clips are present in the right axillary tissue.  2) LYMPH NODE [B]  Mammo diagnostic bilateral w 3d and cad: There is no corresponding lymph node seen on this modality.   US breast right limited (diagnostic): Given the presence of a suspicious mass in the right breast, targeted ultrasound of the right axillary tissue was performed.  A mildly prominent node with a thickened cortex up to 3 mm is present in the right axillary tissue.      Left  Mammo diagnostic bilateral w 3d and cad  There are postsurgical changes related to interval mastectomy and TRAM flap reconstruction.  There are no suspicious masses, grouped microcalcifications or areas of unexplained architectural distortion. The skin and nipple areolar complex are unremarkable.         IMPRESSION:  Suspicious right breast mass at the 2:00 axis.  Recommend ultrasound-guided biopsy.  Mildly suspicious right axillary lymph node.   Recommend ultrasound-guided biopsy.  No suspicious findings are present in the left breast which has undergone TRAM flap reconstruction.  Recommend continued clinical management.     All findings and recommendations were discussed directly with the patient prior to leaving the breast center with the assistance of an  and her daughter.  A member of the breast health services nursing staff will facilitate a biopsy appointment.        ASSESSMENT/BI-RADS CATEGORY:  Left: 2 - Benign  Right: 4 - Suspicious  Overall: 4 - Suspicious     RECOMMENDATION:       - Ultrasound-guided breast biopsy for the right breast.       - Clinical management for the left breast.     Workstation ID: [id number]        Signed by:  Teetee Huber MD                 Imaging    Mammo diagnostic bilateral w 3d and cad (Order: [id number]) - 12/27/20

## 2025-01-23 NOTE — PATIENT INSTRUCTIONS
Ordered blood work, right mammography, ultrasound right breast and CT chest, all of these around Silva 10 and MRI abdomen a week later.  Follow-up in 5 months.

## 2025-02-13 ENCOUNTER — OFFICE VISIT (OUTPATIENT)
Dept: FAMILY MEDICINE CLINIC | Facility: CLINIC | Age: 50
End: 2025-02-13
Payer: COMMERCIAL

## 2025-02-13 VITALS
HEART RATE: 74 BPM | BODY MASS INDEX: 24.3 KG/M2 | TEMPERATURE: 97.5 F | DIASTOLIC BLOOD PRESSURE: 82 MMHG | OXYGEN SATURATION: 98 % | WEIGHT: 151.2 LBS | HEIGHT: 66 IN | SYSTOLIC BLOOD PRESSURE: 108 MMHG

## 2025-02-13 DIAGNOSIS — Z12.4 SCREENING FOR CERVICAL CANCER: ICD-10-CM

## 2025-02-13 DIAGNOSIS — Z17.0 MALIGNANT NEOPLASM OF RIGHT BREAST IN FEMALE, ESTROGEN RECEPTOR POSITIVE, UNSPECIFIED SITE OF BREAST (HCC): ICD-10-CM

## 2025-02-13 DIAGNOSIS — Z13.220 SCREENING FOR LIPID DISORDERS: ICD-10-CM

## 2025-02-13 DIAGNOSIS — Z12.11 SCREENING FOR COLON CANCER: ICD-10-CM

## 2025-02-13 DIAGNOSIS — Z00.00 ROUTINE HEALTH MAINTENANCE: ICD-10-CM

## 2025-02-13 DIAGNOSIS — C50.911 MALIGNANT NEOPLASM OF RIGHT BREAST IN FEMALE, ESTROGEN RECEPTOR POSITIVE, UNSPECIFIED SITE OF BREAST (HCC): ICD-10-CM

## 2025-02-13 DIAGNOSIS — Z00.00 ANNUAL PHYSICAL EXAM: Primary | ICD-10-CM

## 2025-02-13 PROCEDURE — 99386 PREV VISIT NEW AGE 40-64: CPT

## 2025-02-13 PROCEDURE — 99213 OFFICE O/P EST LOW 20 MIN: CPT

## 2025-02-13 NOTE — PROGRESS NOTES
"Adult Annual Physical  Name: Brandi Godfrey      : 1975      MRN: [id number]  Encounter Provider: Wes Chaparro PA-C  Encounter Date: 2025   Encounter department: [facility]    Assessment & Plan  Annual physical exam         Malignant neoplasm of right breast in female, estrogen receptor positive, unspecified site of breast (HCC)  Follows with hematology oncology most recent note personally reviewed:  \"On mammography and ultrasound of the right breast patient has suspicious mass in the right breast and right axilla and she needs ultrasound-guided needle biopsies but she does not want to go for any biopsy at this time.  She wants these studies to be repeated in few months back in 2025\"  Review of chart also reveals ongoing workup with routine labs and specialized labs for malignancy, personally reviewed  Exam unremarkable  Pt informs me is has all testing that is indicated ordered, she denies fever, fatigue, wt loss  Continue to follow with heme onc        Routine health maintenance    Orders:    CBC and differential; Future    Comprehensive metabolic panel; Future    Screening for lipid disorders    Orders:    Lipid panel; Future    Screening for cervical cancer    Orders:    Ambulatory Referral to Obstetrics / Gynecology; Future    Screening for colon cancer  + Hx of polyp   Orders:    Ambulatory referral to Gastroenterology; Future    Immunizations and preventive care screenings were discussed with patient today. Appropriate education was printed on patient's after visit summary.    Counseling:  Dental Health: discussed importance of regular tooth brushing, flossing, and dental visits.  Exercise: the importance of regular exercise/physical activity was discussed. Recommend exercise 3-5 times per week for at least 30 minutes.          History of Present Illness     Adult Annual Physical:  Patient presents for annual physical. Brandi Godfrey is a 49 y.o. female with " significant Hx of breast CA to establish care and is also due for annual    vaccines, care gaps, HCC, screenings, routine labs, reviewed at this visit. Denies vaccination   [id number] present and used to interpret throughout encounter.     Diet and Physical Activity:  - Diet/Nutrition: well balanced diet.  - Exercise: walking and no formal exercise.    Depression Screening:  - PHQ-2 Score: 0    General Health:  - Sleep: sleeps well.  - Hearing: decreased hearing bilateral ears. no desire to see specialist  - Vision: no vision problems.  - Dental: no dental visits for > 1 year.    Review of Systems   Constitutional:  Negative for appetite change, chills, diaphoresis, fatigue, fever and unexpected weight change.   HENT:  Negative for ear pain and sore throat.    Eyes:  Negative for pain and visual disturbance.   Respiratory:  Negative for cough and shortness of breath.    Cardiovascular:  Negative for chest pain and palpitations.   Gastrointestinal:  Negative for abdominal pain and vomiting.   Genitourinary:  Negative for dysuria and hematuria.   Musculoskeletal:  Negative for arthralgias and back pain.   Skin:  Negative for color change and rash.   Neurological:  Negative for seizures and syncope.   All other systems reviewed and are negative.    Medical History Reviewed by provider this encounter:  Allergies  Meds     .  Past Medical History   Past Medical History:   Diagnosis Date    Breast cancer (HCC)     right breast cancer (Sept 2023) with lymph ode removal     Past Surgical History:   Procedure Laterality Date    MAMMO STEREOTACTIC BREAST BIOPSY BILATERAL (ALL INC) EACH ADD Bilateral 12/6/2021    MRI BREAST BIOPSY BILATERAL Bilateral 7/20/2023     Family History   Problem Relation Age of Onset    Stroke Mother     Cancer Father     No Known Problems Sister     No Known Problems Daughter     No Known Problems Son       reports that she has never smoked. She has never used smokeless tobacco.  Current  Outpatient Medications on File Prior to Visit   Medication Sig Dispense Refill    acetaminophen (TYLENOL) 325 mg tablet Take 325 mg by mouth (Patient not taking: Reported on 1/18/2024)      Ascorbic Acid (Vitamin C) 500 MG CHEW Chew 500 mg in the morning      bisacodyl (DULCOLAX) 5 mg EC tablet Take 5 mg by mouth (Patient not taking: Reported on 1/18/2024)      LORazepam (Ativan) 0.5 mg tablet Take 1 tablet (0.5 mg total) by mouth every 6 (six) hours as needed for anxiety 28 tablet 0    Na Sulfate-K Sulfate-Mg Sulf 17.5-3.13-1.6 GM/177ML SOLN Take as directed (Patient not taking: Reported on 1/18/2024)      ondansetron (ZOFRAN) 4 mg tablet Take 4 mg by mouth (Patient not taking: Reported on 1/18/2024)      [DISCONTINUED] oxyCODONE (ROXICODONE) 5 immediate release tablet Take 5 mg by mouth (Patient not taking: Reported on 1/18/2024)       No current facility-administered medications on file prior to visit.   No Known Allergies   Current Outpatient Medications on File Prior to Visit   Medication Sig Dispense Refill    acetaminophen (TYLENOL) 325 mg tablet Take 325 mg by mouth (Patient not taking: Reported on 1/18/2024)      Ascorbic Acid (Vitamin C) 500 MG CHEW Chew 500 mg in the morning      bisacodyl (DULCOLAX) 5 mg EC tablet Take 5 mg by mouth (Patient not taking: Reported on 1/18/2024)      LORazepam (Ativan) 0.5 mg tablet Take 1 tablet (0.5 mg total) by mouth every 6 (six) hours as needed for anxiety 28 tablet 0    Na Sulfate-K Sulfate-Mg Sulf 17.5-3.13-1.6 GM/177ML SOLN Take as directed (Patient not taking: Reported on 1/18/2024)      ondansetron (ZOFRAN) 4 mg tablet Take 4 mg by mouth (Patient not taking: Reported on 1/18/2024)      [DISCONTINUED] oxyCODONE (ROXICODONE) 5 immediate release tablet Take 5 mg by mouth (Patient not taking: Reported on 1/18/2024)       No current facility-administered medications on file prior to visit.      Social History     Tobacco Use    Smoking status: Never    Smokeless  "tobacco: Never   Substance and Sexual Activity    Alcohol use: Not on file    Drug use: Not on file    Sexual activity: Not on file       Objective   Ht 5' 6\" (1.676 m)   Wt 68.6 kg (151 lb 3.2 oz)   BMI 24.40 kg/m²     Physical Exam  Vitals and nursing note reviewed.   Constitutional:       General: She is not in acute distress.     Appearance: She is well-developed.   HENT:      Head: Normocephalic and atraumatic.      Right Ear: Tympanic membrane normal.      Left Ear: Tympanic membrane normal.      Nose: Nose normal.      Mouth/Throat:      Pharynx: Oropharynx is clear.   Eyes:      Conjunctiva/sclera: Conjunctivae normal.      Pupils: Pupils are equal, round, and reactive to light.   Cardiovascular:      Rate and Rhythm: Normal rate and regular rhythm.      Heart sounds: No murmur heard.  Pulmonary:      Effort: Pulmonary effort is normal. No respiratory distress.      Breath sounds: Normal breath sounds. No wheezing.   Abdominal:      Palpations: Abdomen is soft.      Tenderness: There is no abdominal tenderness.   Musculoskeletal:         General: No swelling.      Cervical back: Neck supple.   Skin:     General: Skin is warm and dry.   Neurological:      Mental Status: She is alert and oriented to person, place, and time.   Psychiatric:         Mood and Affect: Mood normal.         "

## 2025-02-13 NOTE — ASSESSMENT & PLAN NOTE
"Follows with hematology oncology most recent note personally reviewed:  \"On mammography and ultrasound of the right breast patient has suspicious mass in the right breast and right axilla and she needs ultrasound-guided needle biopsies but she does not want to go for any biopsy at this time.  She wants these studies to be repeated in few months back in June 2025\"  Review of chart also reveals ongoing workup with routine labs and specialized labs for malignancy, personally reviewed  Exam unremarkable  Pt informs me is has all testing that is indicated ordered, she denies fever, fatigue, wt loss  Continue to follow with heme onc        "

## 2025-02-14 ENCOUNTER — APPOINTMENT (OUTPATIENT)
Dept: LAB | Facility: CLINIC | Age: 50
End: 2025-02-14
Payer: COMMERCIAL

## 2025-02-14 DIAGNOSIS — Z01.84 IMMUNITY STATUS TESTING: ICD-10-CM

## 2025-02-14 DIAGNOSIS — Z02.83 ENCOUNTER FOR DRUG SCREENING: Primary | ICD-10-CM

## 2025-02-14 DIAGNOSIS — Z00.00 ROUTINE HEALTH MAINTENANCE: ICD-10-CM

## 2025-02-14 DIAGNOSIS — Z02.83 ENCOUNTER FOR DRUG SCREENING: ICD-10-CM

## 2025-02-14 DIAGNOSIS — Z11.1 SCREENING FOR TUBERCULOSIS: ICD-10-CM

## 2025-02-14 DIAGNOSIS — C50.911 MALIGNANT NEOPLASM OF RIGHT BREAST IN FEMALE, ESTROGEN RECEPTOR POSITIVE, UNSPECIFIED SITE OF BREAST (HCC): ICD-10-CM

## 2025-02-14 DIAGNOSIS — Z17.0 MALIGNANT NEOPLASM OF RIGHT BREAST IN FEMALE, ESTROGEN RECEPTOR POSITIVE, UNSPECIFIED SITE OF BREAST (HCC): ICD-10-CM

## 2025-02-14 DIAGNOSIS — Z13.220 SCREENING FOR LIPID DISORDERS: ICD-10-CM

## 2025-02-14 LAB
ALBUMIN SERPL BCG-MCNC: 4.1 G/DL (ref 3.5–5)
ALP SERPL-CCNC: 50 U/L (ref 34–104)
ALT SERPL W P-5'-P-CCNC: 11 U/L (ref 7–52)
ANION GAP SERPL CALCULATED.3IONS-SCNC: 8 MMOL/L (ref 4–13)
AST SERPL W P-5'-P-CCNC: 15 U/L (ref 13–39)
BASOPHILS # BLD AUTO: 0.06 THOUSANDS/ÂΜL (ref 0–0.1)
BASOPHILS NFR BLD AUTO: 1 % (ref 0–1)
BILIRUB SERPL-MCNC: 0.86 MG/DL (ref 0.2–1)
BUN SERPL-MCNC: 10 MG/DL (ref 5–25)
CALCIUM SERPL-MCNC: 8.8 MG/DL (ref 8.4–10.2)
CHLORIDE SERPL-SCNC: 105 MMOL/L (ref 96–108)
CHOLEST SERPL-MCNC: 250 MG/DL (ref ?–200)
CO2 SERPL-SCNC: 28 MMOL/L (ref 21–32)
CREAT SERPL-MCNC: 0.6 MG/DL (ref 0.6–1.3)
EOSINOPHIL # BLD AUTO: 0.19 THOUSAND/ÂΜL (ref 0–0.61)
EOSINOPHIL NFR BLD AUTO: 4 % (ref 0–6)
ERYTHROCYTE [DISTWIDTH] IN BLOOD BY AUTOMATED COUNT: 12.1 % (ref 11.6–15.1)
GFR SERPL CREATININE-BSD FRML MDRD: 108 ML/MIN/1.73SQ M
GLUCOSE P FAST SERPL-MCNC: 82 MG/DL (ref 65–99)
HCT VFR BLD AUTO: 43 % (ref 34.8–46.1)
HDLC SERPL-MCNC: 57 MG/DL
HGB BLD-MCNC: 13.9 G/DL (ref 11.5–15.4)
IMM GRANULOCYTES # BLD AUTO: 0.02 THOUSAND/UL (ref 0–0.2)
IMM GRANULOCYTES NFR BLD AUTO: 0 % (ref 0–2)
LDLC SERPL CALC-MCNC: 180 MG/DL (ref 0–100)
LYMPHOCYTES # BLD AUTO: 1.54 THOUSANDS/ÂΜL (ref 0.6–4.47)
LYMPHOCYTES NFR BLD AUTO: 34 % (ref 14–44)
MCH RBC QN AUTO: 29.2 PG (ref 26.8–34.3)
MCHC RBC AUTO-ENTMCNC: 32.3 G/DL (ref 31.4–37.4)
MCV RBC AUTO: 90 FL (ref 82–98)
MONOCYTES # BLD AUTO: 0.35 THOUSAND/ÂΜL (ref 0.17–1.22)
MONOCYTES NFR BLD AUTO: 8 % (ref 4–12)
NEUTROPHILS # BLD AUTO: 2.35 THOUSANDS/ÂΜL (ref 1.85–7.62)
NEUTS SEG NFR BLD AUTO: 53 % (ref 43–75)
NONHDLC SERPL-MCNC: 193 MG/DL
NRBC BLD AUTO-RTO: 0 /100 WBCS
PLATELET # BLD AUTO: 222 THOUSANDS/UL (ref 149–390)
PMV BLD AUTO: 10.9 FL (ref 8.9–12.7)
POTASSIUM SERPL-SCNC: 3.9 MMOL/L (ref 3.5–5.3)
PROT SERPL-MCNC: 6.3 G/DL (ref 6.4–8.4)
RBC # BLD AUTO: 4.76 MILLION/UL (ref 3.81–5.12)
RUBV IGG SERPL IA-ACNC: 118.3 IU/ML
SODIUM SERPL-SCNC: 141 MMOL/L (ref 135–147)
TRIGL SERPL-MCNC: 63 MG/DL (ref ?–150)
WBC # BLD AUTO: 4.51 THOUSAND/UL (ref 4.31–10.16)

## 2025-02-14 PROCEDURE — 80053 COMPREHEN METABOLIC PANEL: CPT

## 2025-02-14 PROCEDURE — 86300 IMMUNOASSAY TUMOR CA 15-3: CPT

## 2025-02-14 PROCEDURE — 80307 DRUG TEST PRSMV CHEM ANLYZR: CPT

## 2025-02-14 PROCEDURE — 86765 RUBEOLA ANTIBODY: CPT

## 2025-02-14 PROCEDURE — 86762 RUBELLA ANTIBODY: CPT

## 2025-02-14 PROCEDURE — 36415 COLL VENOUS BLD VENIPUNCTURE: CPT

## 2025-02-14 PROCEDURE — 86480 TB TEST CELL IMMUN MEASURE: CPT

## 2025-02-14 PROCEDURE — 80061 LIPID PANEL: CPT

## 2025-02-14 PROCEDURE — 85025 COMPLETE CBC W/AUTO DIFF WBC: CPT

## 2025-02-15 LAB
CANCER AG27-29 SERPL-ACNC: 22.8 U/ML (ref 0–38.6)
GAMMA INTERFERON BACKGROUND BLD IA-ACNC: 0.02 IU/ML
M TB IFN-G BLD-IMP: NEGATIVE
M TB IFN-G CD4+ BCKGRND COR BLD-ACNC: 0 IU/ML
M TB IFN-G CD4+ BCKGRND COR BLD-ACNC: 0.01 IU/ML
MEV IGG SER QL IA: 15.4 AU/ML
MEV IGG SER QL IA: ABNORMAL
MITOGEN IGNF BCKGRD COR BLD-ACNC: 9.98 IU/ML

## 2025-02-17 DIAGNOSIS — Z01.84 IMMUNITY STATUS TESTING: Primary | ICD-10-CM

## 2025-02-17 LAB
6MAM UR QL SCN: NEGATIVE NG/ML
ACCEPTABLE CREAT UR QL: 175 MG/DL
AMPHET UR QL SCN: NEGATIVE NG/ML
BARBITURATES UR QL SCN: NEGATIVE NG/ML
BENZODIAZ UR QL SCN: NEGATIVE NG/ML
BUPRENORPHINE UR QL CFM: NEGATIVE NG/ML
CANNABINOIDS UR QL SCN: NEGATIVE NG/ML
CARISOPRODOL UR QL: NEGATIVE NG/ML
COCAINE+BZE UR QL SCN: NEGATIVE NG/ML
ETHYL GLUCURONIDE UR QL SCN: NEGATIVE NG/ML
FENTANYL UR QL SCN: NEGATIVE NG/ML
GABAPENTIN SERPLBLD QL SCN: NEGATIVE UG/ML
METHADONE UR QL SCN: NEGATIVE NG/ML
NITRITE UR QL STRIP: NEGATIVE UG/ML
OPIATES UR QL SCN: NEGATIVE NG/ML
OXYCODONE+OXYMORPHONE UR QL SCN: NEGATIVE NG/ML
PCP UR QL SCN: NEGATIVE NG/ML
PROPOXYPH UR QL SCN: NEGATIVE NG/ML
SPECIMEN PH ACCEPTABLE UR: 7.3 (ref 4.5–8.9)
TAPENTADOL UR QL SCN: NEGATIVE NG/ML
TRAMADOL UR QL SCN: NEGATIVE NG/ML

## 2025-02-18 ENCOUNTER — RESULTS FOLLOW-UP (OUTPATIENT)
Dept: FAMILY MEDICINE CLINIC | Facility: CLINIC | Age: 50
End: 2025-02-18

## 2025-02-25 DIAGNOSIS — F41.9 ANXIETY: ICD-10-CM

## 2025-02-26 NOTE — TELEPHONE ENCOUNTER
Refill must be reviewed and completed by the office or provider. The refill is unable to be approved or denied by the medication management team.      Patient Id Prescription # Filled Written Drug Label Qty Days Strength MME** Prescriber Pharmacy Payment REFILL #/Auth State Detail   1 [id number] 01/06/2025 01/06/2025 LORazepam (Tablet) 28.0 7 0.5 MG NA [facility] Medicaid 0 / 0 [place]    1 [id number] 11/29/2024 11/28/2024 LORazepam (Tablet) 28.0 7 0.5 MG NA [facility] Commercial Insurance 0 / 0 [place]    1 [id number] 09/09/2024 09/09/2024 LORazepam (Tablet) 28.0 7 0.5 MG NA [facility] Commercial Insurance 0 / 0 [place]

## 2025-02-28 RX ORDER — LORAZEPAM 0.5 MG/1
0.5 TABLET ORAL EVERY 6 HOURS PRN
Qty: 28 TABLET | Refills: 0 | Status: SHIPPED | OUTPATIENT
Start: 2025-02-28

## 2025-04-03 NOTE — ED ADULT NURSE NOTE - ATTEMPT TO OOB
D: Writer picked up the following medication(s) at [facility] pharmacy on this date.    Medication RX # Qty   Guanfacine HCL ER 1 MG TB24 [id number] 30   Guanfacine HCL ER 2 MG TB24 [id number] 30   Qelbree 150 MG CP24   (1 of 2) [id number] 60   Qelbree 150 MG CP24  (2 of 2) [id number] 60       no

## 2025-04-29 NOTE — ED ADULT NURSE NOTE - BREATH SOUNDS, MLM
Health Maintenance       Diabetes Foot Exam (Yearly)  Overdue since 10/5/2024    Hepatitis B Vaccine (For Physician/APC Discussion) (6 of 6 - Risk Dialysis Recombivax 3-dose series)  Overdue since 1/10/2025    DTaP/Tdap/Td Vaccine (3 - Td or Tdap)  Overdue since 4/16/2025    Shingles Vaccine (1 of 2)  Never done    Respiratory Syncytial Virus (RSV) Vaccine 60+ (1 - Risk 60-74 years 1-dose series)  Never done    Diabetes A1C (Every 6 Months)  Order placed this encounter           Following review of the above:  Patient is not proceeding with: Dtap/Tdap/Td, Hep B, Respiratory Syncytial Virus (RSV), and Shingles    Note: Refer to final orders and clinician documentation.         Clear

## 2025-05-14 ENCOUNTER — TELEPHONE (OUTPATIENT)
Age: 50
End: 2025-05-14

## 2025-05-14 NOTE — PROGRESS NOTES
Diagnoses and all orders for this visit:    Encounter for annual routine gynecological examination  -     Liquid-based pap, screening    Encounter for screening mammogram for malignant neoplasm of breast  -     Mammo screening bilateral w 3d and cad; Future    Colon cancer screening  -     Ambulatory referral to Gastroenterology; Future    Hx of ovarian cyst  -     US pelvis complete w transvaginal; Future    Other orders  -     Ambulatory Referral to Obstetrics / Gynecology      Perineal hygiene reviewed   Weight bearing exercises minium of 150 mins/weekly advised.   Kegel exercises recommended daily, see AVS for instructions and recommendations  SBE encouraged, ASCCP guidelines reviewed. Condoms encouraged with all sexual activity to prevent STI's.   Gardisil vaccines recommended up to age 45  Calcium/ Vit D dietary requirements discussed,   Advised to call with any issues,  all concerns & questions addressed.   See after visit summary for further information and recommendations to the above mentioned subjects which we may or may not have covered in detail during your visit   F/U Annually and PRN      Health Maintenance:    Last PAP: 1 year ago , [place]  Next PAP Due:collect today     Last Mammogram: 2024    Life time Sheila Mccarthy % suppressed, Density B scattered areas of fibroglandular density , Bi-Rads 4 Suspicious abnormality, needs additional imaging and biopsy which are scheduled   Next Mammogram: order provided by oncology     Last Colonoscopy:  1 year ago in [place] , referral given today , pt would like to follow at [facility], pt does have results in care everywhere from 2023     Gardisil: Not completed     Subjective    CC: Yearly Exam , New Patient to practice, speaks only Polish.  Used  services for visit     Brandi Godfrey is a 50 y.o. female here for an annual exam.   GYN hx includes: 2 vaginal deliveries ,  breast cancer, TRAM flap reconstruction of left breast,  estrogen  "receptor positive, CHEK2 , ovarian cysts hx,    Family hx of: No GYN cancers  Medically stable, reports no changes in medical Hx, follows with PMD    Patient's last menstrual period was 04/18/2025 (exact date).  Her menstrual cycles are regular every 28-30 days. Flow is moderate, lasting 3-4 days  Denies history of abnormal pap smear.  She denies breast concerns, abnormal vaginal discharge, vaginal itching, odor, irritation, bowel dysfunction, urinary symptoms, pelvic pain today.    Mentions occasional urinary leakage with cough, laugh, sneeze. Reviewed Kegel's, instructions provided in AVS, if not helpful after 3 months of regular Kegel's we can refer to PT   She is sexually active. Monogamous relationship.  Her current method of contraception includes  none. Denies any issues with her BCM.  She does not want STD testing today.  Denies intimate partner violence    Family History   Problem Relation Age of Onset    Stroke Mother     Cancer Father     No Known Problems Sister     No Known Problems Daughter     No Known Problems Son        Vitals:    05/15/25 0734   BP: 120/70   BP Location: Left arm   Patient Position: Sitting   Cuff Size: Large   Weight: 66.7 kg (147 lb)   Height: 5' 6\" (1.676 m)     Body mass index is 23.73 kg/m².    Review of Systems     Constitutional: Negative for chills, fatigue, fever, headaches, visual disturbances, and unexpected weight change.   Respiratory: Negative for cough, & shortness of breath.  Cardiovascular: Negative for chest pain. .    Gastrointestinal: Negative for Abd pain, nausea & vomiting, constipation and diarrhea.   Genitourinary: Negative for difficulty urinating, dysuria, hematuria,  unusual vaginal bleeding or discharge  Skin: Negative skin changes    Physical Exam     Constitutional: Alert & Oriented x3, well-developed and well-nourished. No distress.   HENT: Atraumatic, Normocephalic, Conjunctivae clear  Neck: Normal range of motion.   Pulmonary: Effort normal. "   Abdominal: Soft. No tenderness or masses  Musculoskeletal: Normal ROM  Skin: Warm & Dry  Psychological: Normal mood, thought content, behavior & judgement     Breasts: + Breast cancer   Right: tissue soft without masses, tenderness, skin changes or nipple discharge.   Left:  tissue soft without masses, tenderness, skin changes or nipple discharge. TRAM flap reconstruction     Pelvic exam was performed with patient supine, lithotomy position.      Labia: Negative rash, tenderness, lesion or injury on the right labia.              Negative rash, tenderness, lesion or injury on the left labia.   Urethral meatus:  Negative for  tenderness, inflammation or discharge.   Uterus: not deviated, fixed or tender.  Slightly enlarged, Ultrasound ordered  Cervix: No CMT, no discharge or friability.   Right adnexa: no mass, no tenderness and no fullness.  Left adnexa: no mass, no tenderness and no fullness.   Vagina: No erythema, tenderness, masses, or foreign body in the vagina. No signs of injury around the vagina. No unusual vaginal discharge   Perineum without lesions, signs of injury, erythema or swelling.  Inguinal Canal:        Right: No inguinal adenopathy or hernia present.        Left: No inguinal adenopathy or hernia present.

## 2025-05-14 NOTE — TELEPHONE ENCOUNTER
Patients GI provider:       Number to return call: (    Reason for call: Pt daughter calling to get the OBGYN office, while transferring my system went down.    Scheduled procedure/appointment date if applicable: Apt/procedure

## 2025-05-15 ENCOUNTER — ANNUAL EXAM (OUTPATIENT)
Dept: OBGYN CLINIC | Facility: CLINIC | Age: 50
End: 2025-05-15
Payer: COMMERCIAL

## 2025-05-15 VITALS
SYSTOLIC BLOOD PRESSURE: 120 MMHG | BODY MASS INDEX: 23.63 KG/M2 | WEIGHT: 147 LBS | DIASTOLIC BLOOD PRESSURE: 70 MMHG | HEIGHT: 66 IN

## 2025-05-15 DIAGNOSIS — Z12.31 ENCOUNTER FOR SCREENING MAMMOGRAM FOR MALIGNANT NEOPLASM OF BREAST: ICD-10-CM

## 2025-05-15 DIAGNOSIS — Z12.11 COLON CANCER SCREENING: ICD-10-CM

## 2025-05-15 DIAGNOSIS — Z87.42 HX OF OVARIAN CYST: ICD-10-CM

## 2025-05-15 DIAGNOSIS — Z01.419 ENCOUNTER FOR ANNUAL ROUTINE GYNECOLOGICAL EXAMINATION: Primary | ICD-10-CM

## 2025-05-15 PROCEDURE — 99386 PREV VISIT NEW AGE 40-64: CPT | Performed by: OBSTETRICS & GYNECOLOGY

## 2025-05-15 PROCEDURE — G0145 SCR C/V CYTO,THINLAYER,RESCR: HCPCS | Performed by: OBSTETRICS & GYNECOLOGY

## 2025-05-15 PROCEDURE — G0476 HPV COMBO ASSAY CA SCREEN: HCPCS | Performed by: OBSTETRICS & GYNECOLOGY

## 2025-05-15 NOTE — PATIENT INSTRUCTIONS
Patient Education     Lowering Your Risk of Breast Cancer   About this topic   Breast cancer is a serious illness. Breast cancer is when abnormal cells grow and divide more quickly in your breast. These cells form a growth or tumor. The abnormal cells may enter nearby tissue and spread to other parts of the body. It is the type of cancer most often seen in women. Men can have breast cancer, but it is a rare condition.  General   Some things in your life may increase your risk of breast cancer. You may not be able to change some of these. Others you can control.  You are more likely to get breast cancer if you:  Have a mother, sister, or daughter who has had breast cancer  Have used hormones for menopause for more than 5 years  Have had radiation therapy to the breast or chest in the past  Are overweight or do not exercise  Had your first menstrual period before you were 11 years old  Went through menopause after age 56  Have never been pregnant or had your first child after age 35  Have had breast cancer before  Drink alcohol in any form  Have dense breasts  Are older in age  There is no certain way to prevent breast cancer. There are things you can do to lower your chances of having breast cancer.  Keep a healthy weight. Lose weight if you are overweight. Being overweight raises your chances of having breast cancer.  Eat a healthy diet to maintain a healthy weight, such as more fruits, vegetables, and lean cuts of meat. Decrease the amount of saturated fat in your diet.  Exercise. Being active helps you keep a healthy weight.  Limit your alcohol intake or do not drink alcohol. The more alcohol you drink, the higher your risk.  Do not smoke cigarettes. Smoking can increase your risk of many types of cancer.  Breastfeed your baby. This may help protect you. The longer you breastfeed, the more protection you have.  Talk with your doctor about:  Limiting or stopping hormone therapy.  Taking certain drugs to prevent  breast cancer. For women at high risk of having breast cancer, there are a few drugs that may lower your risk.  Surgery to prevent you from having breast cancer if you are very high risk.  When do I need to call the doctor?   Changes in your breasts  A lump or area in your breast that feels different  Discharge from your nipple  Skin on your breast is dimpled or indented  You have questions or concerns about your breasts  Helpful tips   Talk to your doctor about the best kind of breast cancer screening for you.  If you want to do self breast exams, have your doctor show you the right way to do them.  Tell your doctor of any abnormal finding.  Last Reviewed Date   2021-10-04  Consumer Information Use and Disclaimer   This generalized information is a limited summary of diagnosis, treatment, and/or medication information. It is not meant to be comprehensive and should be used as a tool to help the user understand and/or assess potential diagnostic and treatment options. It does NOT include all information about conditions, treatments, medications, side effects, or risks that may apply to a specific patient. It is not intended to be medical advice or a substitute for the medical advice, diagnosis, or treatment of a health care provider based on the health care provider's examination and assessment of a patient’s specific and unique circumstances. Patients must speak with a health care provider for complete information about their health, medical questions, and treatment options, including any risks or benefits regarding use of medications. This information does not endorse any treatments or medications as safe, effective, or approved for treating a specific patient. UpToDate, Inc. and its affiliates disclaim any warranty or liability relating to this information or the use thereof. The use of this information is governed by the Terms of Use, available at  https://www.[employer]wer.com/en/know/clinical-effectiveness-terms   Copyright   Copyright © 2024 UpToDate, Inc. and its affiliates and/or licensors. All rights reserved.       Perineal Hygiene      Your vaginal naturally takes care of its self, it is a self washing system, the less you mess the healthier it will be     Please do not use any anti bacterial soaps,  liquid soaps, body wash or feminine wash to the vulva, these products can cause dermitis, bacterial infections and other vulvar problems.   Your partner should avoid the same products as well to genital area.  Use only water to cleanse vulva, if you feel you need soap you may use a small amount of  Dove White Bar or Dove White Sensitive Skin Bar soap ( no liquid soap) if necessary.    Avoid the use of washcloths, exfoliating ian's, netted scrubbers, or loofa's, use your hands only to cleanse the vulvar tissues    No scented lotions or products are advised in or near your vulva.    Use coconut oil in its solid form for moisture if needed.  No douching this may cause imbalance in your vaginal PH and further issues.    If you wear panty liners, you may apply a thin coating of Vaseline, A&D ointment or coconut oil to the vulvar tissues as a skin barrier     Wear Cotton underware, and loose fitting clothing  Use perfume-free, dye-free laundry detergent for under garments, use a second rinse cycle   Avoid fabric softeners/dryer sheets.         Over the counter probiotic to restore vaginal fawad may be helpful as well, take daily.       You may also look into Boric Acid vaginal suppositories to restore vaginal PH balance for up to 2 weeks as directed on the box. You may not use these if you are pregnant      For vaginal dryness:      You may use:     Coconut oil in solid form, not liquid (organic, pure, unscented) as needed for moisture or lubrication. ( Do not use if allergic)       Replens moisture restore external comfort gel daily ( use as directed on the  box)        Replens long lasting vaginal moisturizer  ( use as directed on the box)     May try NewLife vulvar moisturizer ( found on Amazon )    May try Revaree vaginal inserts        For Vaginal Lubrication:          You may use:     Coconut oil in solid form, not liquid (organic, pure, unscented) as a lubricant or another scent-free lubricant (Astroglide, Uberlube) if needed.  Do not use coconut oil or silicone if using a condom as this may break down the integrity of the condom and cause an unplanned pregnancy              Do not use coconut oil if allergic               Replens silky smooth lubricant, premium silicone based lubricant for intercourse. ( use as directed, a small amount will provide an enhanced natural feeling)     Any premium over the counter vaginal lubricant water or silicone based. Silicone based will have more staying power and friction relief         For Vulvar irritation/itching:        You may use Hydrocortisone 1 % over the counter to external vulvar tissues 2 x daily for 5-7 days to help with irriation and itch relief.  Patient Education     Pelvic Floor Exercises   About this topic   The pelvic floor consists of muscles and strong bands of tissues which support all of the organs in your pelvis. Some of these organs are the bladder and the small and large bowel as well as the womb and the prostate. If the muscles and tissues get weak, your organs may drop. This can lead to other problems. Your urine may leak when you laugh, sneeze, or cough. You may not be able to drain the bladder fully. You may have less problems if you do exercises to strengthen your pelvic floor and abdominal muscles.  Kegel exercises help make the muscles in the pelvic floor stronger. Anyone can do them. It is also important to make your abdominal muscles stronger. In order for these exercises to work, you must be consistent when doing them.  General   Before starting with a program, ask your doctor if you are  healthy enough to do these exercises. Your doctor may have you work with a  or physical therapist to make a safe exercise program to meet your needs.  Strengthening Exercises   Kegel exercises keep your pelvic muscles firm and strong. You can do these in many different positions. Start by lying down with your knees bent and feet on the bed. Squeeze the pelvic muscles as if you are trying to stop the flow of urine. Hold these muscles for a count of 3, and then slowly relax them for a count of 3. Try to work up to squeezing for a count of 10 and slowly relaxing for a count of 10. Increase the muscle squeeze until you get to 10. When relaxing, slowly relax to a count of 10.  Breathe out when you are squeezing and breathe in when you are relaxing. Your goal is to try to do 10 Kegels 3 or more times each day. Take time to rest between sets. Be sure to only contract your pelvic floor muscles, not your buttocks, thighs, or abdominal muscles.  Pelvic floor contractions ? There are a few ways to feel the pelvic muscles contract.  When you are passing urine, try suddenly stopping your flow of urine. Do not do this on a regular basis, but only to feel what the contraction feels like. Doing this while passing urine can lead to other problems.  Put a finger into the vagina or rectum. Contract the muscles around your finger as if you were trying to stop the flow of urine or stop the passing of gas.  Place two chairs without arms about 2 inches (5 cm) apart. Sit so you have one butt cheek on each chair. Now, try tana your pelvic floor muscles. This will help you keep from using other muscles.  Pelvic tilts ? Lie on your back with your knees bent and feet flat on the floor. Tighten your stomach muscles and press your lower back down to the floor. Try doing Kegels with this exercise when your back is flattened. Hold 3 to 5 seconds. Relax.  Straight leg raises lying down ? Lie on your back with one leg straight. Bend  your other knee so the foot is flat on the bed. Keeping your leg straight, lift the leg up to the level of your other knee. Lower it back down. Repeat with the other leg.  Hip lifts ? Lie on your back with your knees bent and feet flat on the floor. Tighten your stomach muscles and lift your buttocks off the floor. Try doing Kegels when up in this position. Hold 3 to 5 seconds. Relax.  Abdominal bracing ? Do this exercise in different positions: Lying down, sitting, and standing. Tighten your stomach muscles. While keeping the stomach muscles tight, tighten your pelvic floor muscles. Now, forcefully laugh or cough to see if you were able to prevent urine from leaking.  Abdominal crunches ? Lie on your back with both knees bent. Keep your feet flat on the floor. Place your hands in one of these positions. Try starting with the first position since it is the easiest. As you get better, use the other positions to make it harder.  Crunches with arms at sides.  Crunches with arms across chest.  Crunches with arms behind head. Be careful not to interlock your fingers behind your neck or head while doing crunches. This may add tension to your neck and cause strain.  Look at the ceiling. Tighten your belly muscles and lift your shoulders and upper back off the floor. Breathe out while you are doing this. Lower your shoulders to the floor. Breathe in while you are doing this. Relax your belly muscles all the way before starting another crunch.             What will the results be?   Less leakage of urine when you cough, sneeze, laugh, or run  Fewer strong urges to pass urine  Fewer trips to the bathroom each day  Less risk of organs, such as the uterus or bladder, dropping into the vagina  Faster recovery after childbirth or prostate surgery  Stronger core muscles  Increased sensitivity during sex  Helpful tips   You can also try doing a different kind of Kegels. Do 5 quick, strong pelvic floor contractions. Sometimes, if  you have an urge to pass urine but are not near a bathroom, you can do this kind of Kegel to calm the urge.  Stay active and work out to keep your muscles strong and flexible.  Keep a healthy weight to avoid putting too much stress on your spine. Eat a healthy diet to keep your muscles healthy.  Be sure you do not hold your breath when exercising. This can raise your blood pressure. If you tend to hold your breath, try counting out loud when exercising. If any exercise bothers you, stop right away.  Try walking or cycling at an easy pace for a few minutes to warm up your muscles. Do this again after exercising.  Doing exercises before a meal may be a good way to get into a routine. A good time to do these exercises is each time you are stopped at a stop light while driving.  Exercise may be slightly uncomfortable, but you should not have sharp pains. If you do get sharp pains, stop what you are doing. If the sharp pains continue, call your doctor.  Last Reviewed Date   2021-03-31  Consumer Information Use and Disclaimer   This generalized information is a limited summary of diagnosis, treatment, and/or medication information. It is not meant to be comprehensive and should be used as a tool to help the user understand and/or assess potential diagnostic and treatment options. It does NOT include all information about conditions, treatments, medications, side effects, or risks that may apply to a specific patient. It is not intended to be medical advice or a substitute for the medical advice, diagnosis, or treatment of a health care provider based on the health care provider's examination and assessment of a patient’s specific and unique circumstances. Patients must speak with a health care provider for complete information about their health, medical questions, and treatment options, including any risks or benefits regarding use of medications. This information does not endorse any treatments or medications as safe,  effective, or approved for treating a specific patient. UpToDate, Inc. and its affiliates disclaim any warranty or liability relating to this information or the use thereof. The use of this information is governed by the Terms of Use, available at https://www.[employer].com/en/know/clinical-effectiveness-terms   Copyright   Copyright © 2024 UpToDate, Inc. and its affiliates and/or licensors. All rights reserved.

## 2025-05-16 ENCOUNTER — PREP FOR PROCEDURE (OUTPATIENT)
Age: 50
End: 2025-05-16

## 2025-05-16 ENCOUNTER — TELEPHONE (OUTPATIENT)
Age: 50
End: 2025-05-16

## 2025-05-16 DIAGNOSIS — Z12.11 SCREENING FOR COLON CANCER: Primary | ICD-10-CM

## 2025-05-16 LAB
HPV HR 12 DNA CVX QL NAA+PROBE: NEGATIVE
HPV16 DNA CVX QL NAA+PROBE: NEGATIVE
HPV18 DNA CVX QL NAA+PROBE: NEGATIVE

## 2025-05-16 NOTE — TELEPHONE ENCOUNTER
05/16/25  Screened by: Sultana Adrian    Referring Provider Wes Chaparro    Pre- Screening:     There is no height or weight on file to calculate BMI.  Has patient been referred for a routine screening Colonoscopy? yes  Is the patient between 45-75 years old? yes      Previous Colonoscopy yes   If yes:    Date: 2023    Facility:     Reason:         Does the patient want to see a Gastroenterologist prior to their procedure OR are they having any GI symptoms? no    Has the patient been hospitalized or had abdominal surgery in the past 6 months? no    Does the patient use supplemental oxygen? no    Does the patient take Coumadin, Lovenox, Plavix, Elliquis, Xarelto, or other blood thinning medication? no    Has the patient had a stroke, cardiac event, or stent placed in the past year? no        If patient is between 45yrs - 49yrs, please advise patient that we will have to confirm benefits & coverage with their insurance company for a routine screening colonoscopy.

## 2025-05-16 NOTE — TELEPHONE ENCOUNTER
Scheduled date of colonoscopy (as of today):7/7/25  Physician performing colonoscopy:  Location of colonoscopy:[place]  Bowel prep reviewed with patient:Pradeep/dul prep instr sent to pt's email [email or web address]   Instructions reviewed with patient by:MELISSA  Clearances: N/A

## 2025-05-20 ENCOUNTER — RESULTS FOLLOW-UP (OUTPATIENT)
Dept: OBGYN CLINIC | Facility: CLINIC | Age: 50
End: 2025-05-20

## 2025-05-20 LAB
LAB AP GYN PRIMARY INTERPRETATION: NORMAL
Lab: NORMAL

## 2025-06-03 ENCOUNTER — HOSPITAL ENCOUNTER (OUTPATIENT)
Dept: ULTRASOUND IMAGING | Facility: CLINIC | Age: 50
Discharge: HOME | End: 2025-06-03
Attending: INTERNAL MEDICINE
Payer: COMMERCIAL

## 2025-06-03 ENCOUNTER — HOSPITAL ENCOUNTER (OUTPATIENT)
Dept: MAMMOGRAPHY | Facility: CLINIC | Age: 50
Discharge: HOME | End: 2025-06-03
Attending: INTERNAL MEDICINE
Payer: COMMERCIAL

## 2025-06-03 VITALS — BODY MASS INDEX: 23.63 KG/M2 | WEIGHT: 147 LBS | HEIGHT: 66 IN

## 2025-06-03 DIAGNOSIS — C50.911 MALIGNANT NEOPLASM OF RIGHT BREAST IN FEMALE, ESTROGEN RECEPTOR POSITIVE, UNSPECIFIED SITE OF BREAST (HCC): ICD-10-CM

## 2025-06-03 DIAGNOSIS — C77.9 REGIONAL LYMPH NODE METASTASIS PRESENT (HCC): ICD-10-CM

## 2025-06-03 DIAGNOSIS — Z17.0 MALIGNANT NEOPLASM OF RIGHT BREAST IN FEMALE, ESTROGEN RECEPTOR POSITIVE, UNSPECIFIED SITE OF BREAST (HCC): ICD-10-CM

## 2025-06-03 PROCEDURE — G0279 TOMOSYNTHESIS, MAMMO: HCPCS

## 2025-06-03 PROCEDURE — 77065 DX MAMMO INCL CAD UNI: CPT

## 2025-06-03 PROCEDURE — 76642 ULTRASOUND BREAST LIMITED: CPT

## 2025-06-04 ENCOUNTER — TELEPHONE (OUTPATIENT)
Dept: MAMMOGRAPHY | Facility: CLINIC | Age: 50
End: 2025-06-04

## 2025-06-10 ENCOUNTER — HOSPITAL ENCOUNTER (OUTPATIENT)
Dept: CT IMAGING | Facility: HOSPITAL | Age: 50
Discharge: HOME | End: 2025-06-10
Attending: INTERNAL MEDICINE
Payer: COMMERCIAL

## 2025-06-10 DIAGNOSIS — R91.8 PULMONARY NODULES: ICD-10-CM

## 2025-06-10 DIAGNOSIS — C50.911 MALIGNANT NEOPLASM OF RIGHT BREAST IN FEMALE, ESTROGEN RECEPTOR POSITIVE, UNSPECIFIED SITE OF BREAST (HCC): ICD-10-CM

## 2025-06-10 DIAGNOSIS — Z17.0 MALIGNANT NEOPLASM OF RIGHT BREAST IN FEMALE, ESTROGEN RECEPTOR POSITIVE, UNSPECIFIED SITE OF BREAST (HCC): ICD-10-CM

## 2025-06-10 PROCEDURE — 71250 CT THORAX DX C-: CPT

## 2025-06-12 NOTE — TELEPHONE ENCOUNTER
Spoke to patient and pt daughter, Usman, pt does not want to schedule breast biopsies at this time, states that she wants to call back in 2-3 weeks after she speaks to her doctor, when asked which doctor, pt daughter states she is going back to [place] to see her original cancer doctor, pt adv that at this time we are scheduling about 3 weeks out for biopsies, pt states understanding and does not want to schedule anything at this time, I will make outreach to patient in 3 weeks for follow up,

## 2025-06-16 ENCOUNTER — HOSPITAL ENCOUNTER (OUTPATIENT)
Dept: MRI IMAGING | Facility: HOSPITAL | Age: 50
Discharge: HOME | End: 2025-06-16
Attending: INTERNAL MEDICINE
Payer: COMMERCIAL

## 2025-06-16 DIAGNOSIS — K76.89 LIVER CYST: ICD-10-CM

## 2025-06-16 DIAGNOSIS — Z17.0 MALIGNANT NEOPLASM OF RIGHT BREAST IN FEMALE, ESTROGEN RECEPTOR POSITIVE, UNSPECIFIED SITE OF BREAST (HCC): ICD-10-CM

## 2025-06-16 DIAGNOSIS — C50.911 MALIGNANT NEOPLASM OF RIGHT BREAST IN FEMALE, ESTROGEN RECEPTOR POSITIVE, UNSPECIFIED SITE OF BREAST (HCC): ICD-10-CM

## 2025-06-16 DIAGNOSIS — K76.9 LIVER LESION: ICD-10-CM

## 2025-06-16 PROCEDURE — 74183 MRI ABD W/O CNTR FLWD CNTR: CPT

## 2025-06-16 PROCEDURE — A9585 GADOBUTROL INJECTION: HCPCS | Performed by: INTERNAL MEDICINE

## 2025-06-16 RX ORDER — GADOBUTROL 604.72 MG/ML
6 INJECTION INTRAVENOUS
Status: COMPLETED | OUTPATIENT
Start: 2025-06-16 | End: 2025-06-16

## 2025-06-16 RX ADMIN — GADOBUTROL 6 ML: 604.72 INJECTION INTRAVENOUS at 18:48

## 2025-07-01 ENCOUNTER — APPOINTMENT (OUTPATIENT)
Dept: LAB | Facility: CLINIC | Age: 50
End: 2025-07-01
Payer: COMMERCIAL

## 2025-07-01 DIAGNOSIS — Z17.0 MALIGNANT NEOPLASM OF RIGHT BREAST IN FEMALE, ESTROGEN RECEPTOR POSITIVE, UNSPECIFIED SITE OF BREAST (HCC): ICD-10-CM

## 2025-07-01 DIAGNOSIS — Z01.84 IMMUNITY STATUS TESTING: ICD-10-CM

## 2025-07-01 DIAGNOSIS — C50.911 MALIGNANT NEOPLASM OF RIGHT BREAST IN FEMALE, ESTROGEN RECEPTOR POSITIVE, UNSPECIFIED SITE OF BREAST (HCC): ICD-10-CM

## 2025-07-01 LAB
ALBUMIN SERPL BCG-MCNC: 4.3 G/DL (ref 3.5–5)
ALP SERPL-CCNC: 47 U/L (ref 34–104)
ALT SERPL W P-5'-P-CCNC: 12 U/L (ref 7–52)
ANION GAP SERPL CALCULATED.3IONS-SCNC: 8 MMOL/L (ref 4–13)
AST SERPL W P-5'-P-CCNC: 14 U/L (ref 13–39)
BASOPHILS # BLD AUTO: 0.05 THOUSANDS/ÂΜL (ref 0–0.1)
BASOPHILS NFR BLD AUTO: 1 % (ref 0–1)
BILIRUB SERPL-MCNC: 0.97 MG/DL (ref 0.2–1)
BUN SERPL-MCNC: 12 MG/DL (ref 5–25)
CALCIUM SERPL-MCNC: 8.8 MG/DL (ref 8.4–10.2)
CHLORIDE SERPL-SCNC: 105 MMOL/L (ref 96–108)
CO2 SERPL-SCNC: 27 MMOL/L (ref 21–32)
CREAT SERPL-MCNC: 0.63 MG/DL (ref 0.6–1.3)
EOSINOPHIL # BLD AUTO: 0.18 THOUSAND/ÂΜL (ref 0–0.61)
EOSINOPHIL NFR BLD AUTO: 3 % (ref 0–6)
ERYTHROCYTE [DISTWIDTH] IN BLOOD BY AUTOMATED COUNT: 12.6 % (ref 11.6–15.1)
GFR SERPL CREATININE-BSD FRML MDRD: 105 ML/MIN/1.73SQ M
GLUCOSE SERPL-MCNC: 88 MG/DL (ref 65–140)
HCT VFR BLD AUTO: 41.5 % (ref 34.8–46.1)
HGB BLD-MCNC: 14.1 G/DL (ref 11.5–15.4)
IMM GRANULOCYTES # BLD AUTO: 0.01 THOUSAND/UL (ref 0–0.2)
IMM GRANULOCYTES NFR BLD AUTO: 0 % (ref 0–2)
LYMPHOCYTES # BLD AUTO: 2.27 THOUSANDS/ÂΜL (ref 0.6–4.47)
LYMPHOCYTES NFR BLD AUTO: 33 % (ref 14–44)
MCH RBC QN AUTO: 29.7 PG (ref 26.8–34.3)
MCHC RBC AUTO-ENTMCNC: 34 G/DL (ref 31.4–37.4)
MCV RBC AUTO: 87 FL (ref 82–98)
MONOCYTES # BLD AUTO: 0.5 THOUSAND/ÂΜL (ref 0.17–1.22)
MONOCYTES NFR BLD AUTO: 7 % (ref 4–12)
NEUTROPHILS # BLD AUTO: 3.94 THOUSANDS/ÂΜL (ref 1.85–7.62)
NEUTS SEG NFR BLD AUTO: 56 % (ref 43–75)
NRBC BLD AUTO-RTO: 0 /100 WBCS
PLATELET # BLD AUTO: 214 THOUSANDS/UL (ref 149–390)
PMV BLD AUTO: 11 FL (ref 8.9–12.7)
POTASSIUM SERPL-SCNC: 3.6 MMOL/L (ref 3.5–5.3)
PROT SERPL-MCNC: 6.7 G/DL (ref 6.4–8.4)
RBC # BLD AUTO: 4.75 MILLION/UL (ref 3.81–5.12)
SODIUM SERPL-SCNC: 140 MMOL/L (ref 135–147)
WBC # BLD AUTO: 6.95 THOUSAND/UL (ref 4.31–10.16)

## 2025-07-01 PROCEDURE — 86765 RUBEOLA ANTIBODY: CPT

## 2025-07-01 PROCEDURE — 85025 COMPLETE CBC W/AUTO DIFF WBC: CPT

## 2025-07-01 PROCEDURE — 80053 COMPREHEN METABOLIC PANEL: CPT

## 2025-07-01 PROCEDURE — 86300 IMMUNOASSAY TUMOR CA 15-3: CPT

## 2025-07-01 PROCEDURE — 36415 COLL VENOUS BLD VENIPUNCTURE: CPT

## 2025-07-02 LAB
MEV IGG SER QL IA: 20 AU/ML
MEV IGG SER QL IA: POSITIVE

## 2025-07-03 LAB — CANCER AG27-29 SERPL-ACNC: 30.3 U/ML (ref 0–38.6)

## 2025-07-09 ENCOUNTER — OFFICE VISIT (OUTPATIENT)
Dept: HEMATOLOGY ONCOLOGY | Facility: CLINIC | Age: 50
End: 2025-07-09
Payer: COMMERCIAL

## 2025-07-09 VITALS
RESPIRATION RATE: 18 BRPM | TEMPERATURE: 98.3 F | OXYGEN SATURATION: 99 % | BODY MASS INDEX: 24.11 KG/M2 | HEART RATE: 69 BPM | WEIGHT: 150 LBS | HEIGHT: 66 IN | SYSTOLIC BLOOD PRESSURE: 120 MMHG | DIASTOLIC BLOOD PRESSURE: 70 MMHG

## 2025-07-09 DIAGNOSIS — K76.89 LIVER CYST: ICD-10-CM

## 2025-07-09 DIAGNOSIS — C77.9 REGIONAL LYMPH NODE METASTASIS PRESENT (HCC): ICD-10-CM

## 2025-07-09 DIAGNOSIS — Z15.02 CHEK2-RELATED BREAST CANCER  (HCC): ICD-10-CM

## 2025-07-09 DIAGNOSIS — Z15.89 CHEK2-RELATED BREAST CANCER  (HCC): ICD-10-CM

## 2025-07-09 DIAGNOSIS — C50.919 CHEK2-RELATED BREAST CANCER  (HCC): ICD-10-CM

## 2025-07-09 DIAGNOSIS — F41.9 ANXIETY: ICD-10-CM

## 2025-07-09 DIAGNOSIS — Z15.09 CHEK2-RELATED BREAST CANCER  (HCC): ICD-10-CM

## 2025-07-09 DIAGNOSIS — C50.911 MALIGNANT NEOPLASM OF RIGHT BREAST IN FEMALE, ESTROGEN RECEPTOR POSITIVE, UNSPECIFIED SITE OF BREAST (HCC): Primary | ICD-10-CM

## 2025-07-09 DIAGNOSIS — Z17.0 MALIGNANT NEOPLASM OF RIGHT BREAST IN FEMALE, ESTROGEN RECEPTOR POSITIVE, UNSPECIFIED SITE OF BREAST (HCC): Primary | ICD-10-CM

## 2025-07-09 DIAGNOSIS — R91.8 PULMONARY NODULES: ICD-10-CM

## 2025-07-09 PROCEDURE — 99215 OFFICE O/P EST HI 40 MIN: CPT | Performed by: INTERNAL MEDICINE

## 2025-07-09 RX ORDER — LORAZEPAM 0.5 MG/1
0.5 TABLET ORAL EVERY 8 HOURS PRN
Qty: 21 TABLET | Refills: 0 | Status: SHIPPED | OUTPATIENT
Start: 2025-07-09

## 2025-07-09 NOTE — ASSESSMENT & PLAN NOTE
See CT chest report below     Renewed lorazepam.  Patient has agreed to have breast biopsy and I sent message to nurse Bardales at the breast cancer center.  Please try to get her an appointment as soon as possible.  Patient will call me after the biopsy.  Tentative appointment in 4 weeks in [place]  See diagnoses, orders and instructions above.  Goal is to find out the abnormalities in right breast and right axilla.  Patient is capable of self-care.  All discussed in detail.  Questions answered.  Spent a lot of time going over the abnormalities on mammography and ultrasound and need for biopsy.  I suggested self breast examination, eating healthy foods, staying active but to avoid falls and trauma.  Suggested health screening tests. Patient to continue to follow-up with primary physician and other consultants.  Provided counseling and support.  I used a dictation device to dictate this note and there could be mistakes in my note and for that patient may contact my office.

## 2025-07-09 NOTE — PATIENT INSTRUCTIONS
Renewed lorazepam.  Patient has agreed to have breast biopsy and I sent message to nurse Bardales at the breast cancer center.  Please try to get her an appointment as soon as possible.  Patient will call me after the biopsy.  Tentative appointment in 4 weeks in [place]

## 2025-07-09 NOTE — ASSESSMENT & PLAN NOTE
Orders:    LORazepam (Ativan) 0.5 mg tablet; Take 1 tablet (0.5 mg total) by mouth every 8 (eight) hours as needed for anxiety

## 2025-07-09 NOTE — PROGRESS NOTES
Name: Brandi Godfrey      : 1975      MRN: [id number]  Encounter Provider: Mike Cruz MD  Encounter Date: 2025   Encounter department: [facility]  :  Assessment & Plan  Malignant neoplasm of right breast in female, estrogen receptor positive, unspecified site of breast (HCC)  In 2021 she was found to have 1.4 cm mass in the central portion of right breast and on biopsy that was ER positive, IA positive and HER2 negative invasive breast cancer.  She had MRI scan of both breasts and also a biopsy of the left breast but that did not show malignancy.  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  Patient had bilateral lumpectomies and final report was 2 cm invasive ductal carcinoma, no lymphovascular invasion and negative sentinel lymph nodes.  ER positive.  IA positive.  HER2 negative.  Ki-67 6%.  Oncotype score was 24.  No family history of breast or ovarian cancer.  Patient's mother had pleural malignancy.  Menarche at age 11 and first pregnancy at age 23.  Stage was 1A (T1c N0 M0 G1 ER positive, IA positive, HER2 negative and Oncotype 24)  No other significant past history other than peptic ulcer disease and previous history of lumpectomy.  Former smoker, 10 cigarettes a day for about 20 years and she quit smoking in .  No alcohol.  Patient decided against adjuvant therapy, chemotherapy and hormonal therapy.  She wanted to try alternative/natural therapy.  For CHEK2 mutation she was encouraged to have breast imaging studies, GYN examination and colonoscopy.  Patient had  colonoscopy and there was no malignancy.  Colonoscopy  showed polyps.  Patient saw her gynecologist  in [place] for GYN examination and breast examination.  She follows with her gynecologist in [place].  On 2023 patient had right breast lumpectomy and sentinel lymph node sampling and report was invasive carcinoma of no special type, grade 1, 1.2 cm tumor, 1 of 3 positive  lymph node for metastatic disease, ER 99%, VA 99%, HER2 negative (1+).  Again patient decided not to have radiation or adjuvant therapy.  Patient wanted to try alternative therapy/natural therapy .  She had CT scan and bone scan and also MRI scan of the abdomen because of liver lesion and there was no evidence of malignancy/metastatic disease.   On 6/3/2025 patient had follow-up diagnostic mammography and ultrasound of right breast and that showed:  Mammo diagnostic right w 3d and cad  US breast right limited (diagnostic)  Status: Final result     PACS Images - GE     Show images for Mammo diagnostic right w 3d and cad  PACS Images - Sectra     Show images for Mammo diagnostic right w 3d and cad  Study Result    Narrative & Impression   DIAGNOSIS: Malignant neoplasm of right breast in female, estrogen receptor positive, unspecified site of breast (HCC); Regional lymph node metastasis present (HCC)      TECHNIQUE:   Digital diagnostic mammography was performed. Computer Aided Detection (CAD) analyzed all applicable images.  Right breast ultrasound was performed.      COMPARISONS: Prior breast imaging dated: 12/27/2024, 12/27/2024, 09/15/2023, 09/15/2023, 07/20/2023, 07/20/2023, 07/20/2023, 07/20/2023, 07/20/2023, 06/05/2023, 06/05/2023, 12/06/2021, 12/06/2021, 12/06/2021, 12/06/2021, 12/06/2021, 12/06/2021, 12/06/2021, 11/24/2021, 10/07/2021, and 10/07/2021     RELEVANT HISTORY:   Family Breast Cancer History: No known family history of breast cancer.  Family Medical History: No known relevant family medical history.   Personal History: No known relevant hormone history. Surgical history includes mastectomy. Medical history includes breast cancer.     RISK ASSESSMENT:   Tyrer-Cuzick risk assessment reporting was suppressed due to the patient's history and/or demographic factors.     TISSUE DENSITY:   There are scattered areas of fibroglandular density.     INDICATION: Brandi Godfrey is a 50 y.o. female presenting for  Suspicious mass right breast.  Patient was previously recommended to have a right breast biopsy (2 site).  She did not want to have the procedure done at that time.  She presents today for follow-up imaging.  She has a personal history of breast cancer in 2023 and has had bilateral mastectomies with flap reconstruction.     FINDINGS:   RIGHT  1) MASS [A]  Mammo diagnostic right w 3d and cad: There are 4 or 5 similar irregularly shaped masses with spiculated margins seen in the upper inner quadrant of the right breast at 2 o'clock in the middle depth. Compared to the previous study, the masses increased in size and number.   US breast right limited (diagnostic): There are 3 similar irregularly shaped, non-parallel, hypoechoic masses with indistinct margins seen in the upper inner quadrant of the right breast.  Mass at 1:00, 8 cm from the nipple, posterior depth measures 7 x 5 x 3 mm.  There is internal vascularity.  Mass at 2:00, 4 cm from the nipple, anterior depth (appearing to involve the posterior aspect of the skin) measures 6 x 4 x 6 mm.  This previously measured 6 x 4 x 5 mm.  There is internal vascularity.  Mass at 3:00, 8 cm from the nipple measures 3 x 2 x 3 mm.  No other masses were seen on targeted ultrasound in the upper outer quadrant from 1:00 to 3:00.  2) LYMPH NODE [B]  US breast right limited (diagnostic): The previously described lymph node in the right axilla again demonstrates a cortical thickness of 3 mm.  This is within normal limits.  Ultrasound of the axilla was performed.  At least 1 other lymph node was seen.  No abnormal lymph nodes were seen.  3) MASS [C]  Mammo diagnostic right w 3d and cad: There is a 7 mm mass seen in the axillary tail region of the right breast at 10 o'clock.   US breast right limited (diagnostic): There is a 7 mm x 6 mm x 6 mm irregularly shaped, non-parallel, hypoechoic mass with indistinct margins seen in the axillary tail region of the right breast at 10 o'clock,  11 cm from the nipple.  There is internal vascularity.        IMPRESSION:  There is an increase in number of right breast masses (there are now 4-5 masses in the upper inner quadrant and 1 mass in the axillary tail).  These are highly suggestive of malignancy.  Appropriate action should be taken.  Ultrasound-guided core needle biopsy of the mass in the axillary tail and one of the masses in the medial breast (either the mass at 1:00, 8 cm from the nipple or the mass at 2:00, 4 cm from the nipple) is recommended.  I personally discussed the imaging findings, my high level of concern for malignancy and my recommendation for 2 site biopsy with the patient and her daughter utilizing a .  I also explained the procedure and how it is typically well-tolerated by patients.  They expressed understanding.  They would like to talk to additional family members.  They will be contacted by the nurse navigator tomorrow to try and schedule the biopsy.           ASSESSMENT/BI-RADS CATEGORY:  Right: 5 - Highly Suggestive of Malignancy  Overall: 5 - Highly Suggestive of Malignancy     RECOMMENDATION:       - Ultrasound-guided breast biopsy for the right breast.     Workstation ID: [id number]        Signed by:  Xin Cheek MD                 Imaging    Mammo diagnostic right w 3d and cad (Order: [id number]) - 6/3/2025  Again patient decided not to have biopsy.  When I explained these abnormalities  to the patient and tried to convince her for the biopsy she agreed and I passed that information to breast imaging center and Catia reported back that they will contact the patient.    Patient also has appointment with her breast surgeon in [place] and she plans to keep that appointment       Regional lymph node metastasis present (HCC)  See above       Liver cyst  See MRI report below       Anxiety    Orders:    LORazepam (Ativan) 0.5 mg tablet; Take 1 tablet (0.5 mg total) by mouth every 8 (eight)  hours as needed for anxiety    CHEK2-related breast cancer  (HCC)  For CHEK2 mutation she was encouraged to have breast imaging studies, GYN examination and colonoscopy.  Patient had  colonoscopy and there was no malignancy.  Colonoscopy  showed polyps.  Patient saw her gynecologist  in [place] for GYN examination and breast examination.  She follows with her gynecologist in [place].       Pulmonary nodules  See CT chest report below     Renewed lorazepam.  Patient has agreed to have breast biopsy and I sent message to nurse Bardales at the breast cancer center.  Please try to get her an appointment as soon as possible.  Patient will call me after the biopsy.  Tentative appointment in 4 weeks in [place]  See diagnoses, orders and instructions above.  Goal is to find out the abnormalities in right breast and right axilla.  Patient is capable of self-care.  All discussed in detail.  Questions answered.  Spent a lot of time going over the abnormalities on mammography and ultrasound and need for biopsy.  I suggested self breast examination, eating healthy foods, staying active but to avoid falls and trauma.  Suggested health screening tests. Patient to continue to follow-up with primary physician and other consultants.  Provided counseling and support.  I used a dictation device to dictate this note and there could be mistakes in my note and for that patient may contact my office.            Return in about 4 weeks (around 8/6/2025) for Follow up Proothi.    History of Present Illness   Chief Complaint   Patient presents with    Follow-up   Patient is here with her daughter.  Patient's seems to understand English now but not much.  Daughter is bilingual and she helped with interpretation.  Patient understands the abnormal right mammography and ultrasound and as mentioned above she agreed to have biopsy and I passed that information to breast imaging centers to nurse Bardales.  See detailed information above in the  "assessment section.  Patient is anxious.  No other symptoms.  Oncology History   Cancer Staging   Malignant neoplasm of right breast in female, estrogen receptor positive (HCC)  Staging form: Breast, AJCC 8th Edition  - Pathologic stage from 4/15/2024: Stage IA (rpT1, rpN1(sn), rcM0, G1, ER+, MO+, HER2-, Oncotype DX score: 24) - Signed by Mike Cruz MD on 4/15/2024  Stage prefix: Recurrence  Method of lymph node assessment: Cash lymph node biopsy  Multigene prognostic tests performed: Oncotype DX  Recurrence score range: Greater than or equal to 11  Histologic grading system: 3 grade system  Oncology History   Malignant neoplasm of right breast in female, estrogen receptor positive (HCC)   2/29/2024 Initial Diagnosis    Malignant neoplasm of right breast in female, estrogen receptor positive (HCC)     4/15/2024 -  Cancer Staged    Staging form: Breast, AJCC 8th Edition  - Pathologic stage from 4/15/2024: Stage IA (rpT1, pN1(sn), cM0, G1, ER+, MO+, HER2-, Oncotype DX score: 24) - Signed by Mike Cruz MD on 4/15/2024  Stage prefix: Recurrence  Method of lymph node assessment: Cash lymph node biopsy  Multigene prognostic tests performed: Oncotype DX  Recurrence score range: Greater than or equal to 11  Histologic grading system: 3 grade system          Pertinent Medical History   See details in HPI and assessment  07/09/25:      Review of Systems  Reviewed 12 systems.  Symptoms are in HPI.  No other neurological, cardiac, pulmonary, GI and  symptoms other than listed in HPI.  No other symptoms other than listed in HPI.      Objective   /70 (BP Location: Left arm, Patient Position: Sitting, Cuff Size: Adult)   Pulse 69   Temp 98.3 °F (36.8 °C) (Temporal)   Resp 18   Ht 5' 6\" (1.676 m)   Wt 68 kg (150 lb)   SpO2 99%   BMI 24.21 kg/m²     Pain Screening:  Pain Score: 0-No pain  ECOG   0  Physical Exam  Vitals reviewed.   Constitutional:       General: She is not in acute distress.     " Appearance: Normal appearance. She is not ill-appearing.   HENT:      Head: Normocephalic and atraumatic.      Mouth/Throat:      Mouth: Mucous membranes are moist.      Comments: No thrush.    Eyes:      General: No scleral icterus.        Right eye: No discharge.         Left eye: No discharge.      Conjunctiva/sclera: Conjunctivae normal.       Cardiovascular:      Rate and Rhythm: Normal rate and regular rhythm.      Heart sounds: Normal heart sounds. No murmur heard.  Pulmonary:      Effort: Pulmonary effort is normal. No respiratory distress.      Breath sounds: Normal breath sounds. No wheezing, rhonchi or rales.   Abdominal:      General: Abdomen is flat. There is no distension.      Palpations: Abdomen is soft. There is no mass.      Tenderness: There is no abdominal tenderness.      Comments: No ascites.      Musculoskeletal:         General: No swelling or tenderness. Normal range of motion.      Cervical back: Normal range of motion. No rigidity or tenderness.      Right lower leg: No edema.      Left lower leg: No edema.      Comments: No calf tenderness.   Lymphadenopathy:      Cervical: No cervical adenopathy.      Upper Body:      Right upper body: No supraclavicular or axillary adenopathy.      Left upper body: No supraclavicular or axillary adenopathy.     Skin:     General: Skin is warm.      Coloration: Skin is not jaundiced or pale.      Findings: No bruising or rash.      Nails: There is no clubbing.     Neurological:      General: No focal deficit present.      Mental Status: She is alert and oriented to person, place, and time.      Cranial Nerves: No cranial nerve deficit.      Motor: No weakness.      Coordination: Coordination normal.      Gait: Gait normal.     Psychiatric:         Behavior: Behavior normal.         Thought Content: Thought content normal.      Comments: Anxious     No lymphedema    Labs: I have reviewed the following labs:  Lab Results   Component Value Date/Time    WBC  6.95 07/01/2025 02:38 PM    RBC 4.75 07/01/2025 02:38 PM    Hemoglobin 14.1 07/01/2025 02:38 PM    Hematocrit 41.5 07/01/2025 02:38 PM    MCV 87 07/01/2025 02:38 PM    MCH 29.7 07/01/2025 02:38 PM    RDW 12.6 07/01/2025 02:38 PM    Platelets 214 07/01/2025 02:38 PM    Segmented % 56 07/01/2025 02:38 PM    Lymphocytes % 33 07/01/2025 02:38 PM    Monocytes % 7 07/01/2025 02:38 PM    Eosinophils Relative 3 07/01/2025 02:38 PM    Basophils Relative 1 07/01/2025 02:38 PM    Immature Grans % 0 07/01/2025 02:38 PM    Absolute Neutrophils 3.94 07/01/2025 02:38 PM     Lab Results   Component Value Date/Time    Potassium 3.6 07/01/2025 02:38 PM    Chloride 105 07/01/2025 02:38 PM    CO2 27 07/01/2025 02:38 PM    BUN 12 07/01/2025 02:38 PM    Creatinine 0.63 07/01/2025 02:38 PM    Glucose, Fasting 82 02/14/2025 10:31 AM    Calcium 8.8 07/01/2025 02:38 PM    AST 14 07/01/2025 02:38 PM    ALT 12 07/01/2025 02:38 PM    Alkaline Phosphatase 47 07/01/2025 02:38 PM    Total Protein 6.7 07/01/2025 02:38 PM    Albumin 4.3 07/01/2025 02:38 PM    Total Bilirubin 0.97 07/01/2025 02:38 PM    eGFR 105 07/01/2025 02:38 PM     MRI abdomen w wo contrast  Status: Final result     PACS Images - GE     Show images for MRI abdomen w wo contrast  PACS Images - Sectra     Show images for MRI abdomen w wo contrast  Study Result    Narrative & Impression   MRI - ABDOMEN - WITH AND WITHOUT CONTRAST     INDICATION: 50 years  / Female. C50.911: Malignant neoplasm of unspecified site of right female breast  Z17.0: Estrogen receptor positive status (ER+)  K76.9: Liver disease, unspecified  K76.89: Other specified diseases of liver. Breast cancer. Follow-up hepatic findings on CT chest 06/10/2025.     COMPARISON: CT chest 06/10/2025, MRI abdomen 02/21/2024     TECHNIQUE: Multiplanar/multisequence MRI of the abdomen was performed before and after administration of contrast. Imaging performed on 1.5T MRI.     IV Contrast: 6 mL of Gadobutrol injection  (SINGLE-DOSE)     FINDINGS:     LOWER CHEST: Unremarkable.     LIVER:  Normal in size and configuration.  No suspicious mass. Scattered simple hepatic cysts correlating with the hypoattenuating lesions seen on CT chest 06/10/2025 and grossly stable compared to MRI abdomen 02/21/2024.  Patent hepatic and portal veins.     BILE DUCTS: No intrahepatic or extrahepatic bile duct dilation.     GALLBLADDER: Normal.     PANCREAS: Unremarkable.     ADRENAL GLANDS: Unremarkable.     SPLEEN: Normal.     KIDNEYS/PROXIMAL URETERS: No hydroureteronephrosis. No suspicious renal mass.     BOWEL: No dilated loops of bowel.     PERITONEUM/RETROPERITONEUM: No ascites. Incidentally noted 2.2 cm right ovarian functional cyst/follicle (series 3, image 15).     LYMPH NODES: No abdominal lymphadenopathy.     VESSELS: No aneurysm.     ABDOMINAL WALL: Unremarkable     BONES: No suspicious osseous lesion.     IMPRESSION:     No MRI evidence of intra-abdominal malignancy or metastatic disease.     Scattered simple hepatic cysts correlating with the hypoattenuating lesions seen on CT 06/10/2025.           Resident: LIUDMILA MARTIN I, the attending radiologist, have reviewed the images and agree with the final report above.     Workstation performed: [id number]        Imaging    MRI abdomen w wo contrast (Order: [id number]) - 6/16/2025  CT chest wo contrast  Status: Final result     PACS Images - GE     Show images for CT chest wo contrast  PACS Images - Sectra     Show images for CT chest wo contrast  Study Result    Narrative & Impression   CT CHEST WITHOUT IV CONTRAST     INDICATION: C50.911: Malignant neoplasm of unspecified site of right female breast  Z17.0: Estrogen receptor positive status (ER+)  R91.8: Other nonspecific abnormal finding of lung field. Follow-up pulmonary nodules. Known breast mass.     COMPARISON: CT, 11/21/2024. CT, dated 2/8/2024.     TECHNIQUE: CT examination of the chest was performed without intravenous  contrast. Multiplanar 2D reformatted images were created from the source data.     This examination, like all CT scans performed in the [facility], was performed utilizing techniques to minimize radiation dose exposure, including the use of iterative reconstruction and automated exposure control. Radiation dose length   product (DLP) for this visit: 492 mGy-cm.     FINDINGS:     LUNGS: No new or enlarging pulmonary nodule.     Redemonstrated multiple tiny areas of pulmonary nodularity and probable linear scar, identical dating back until the index study of February 2024 (annotated on series 3).     Small right lower lobe air cyst with no solid component.     Mild bibasilar atelectasis. Central airways are normal.     PLEURA: Redemonstrated trace pleural thickening.     HEART/GREAT VESSELS: Heart is unremarkable for patient's age. No thoracic aortic aneurysm.     MEDIASTINUM AND CAMILO: Unremarkable.     CHEST WALL AND LOWER NECK: Redemonstrated right breast surgical clips and right axillary surgical clips. No new enlarged axillary lymph nodes.     VISUALIZED STRUCTURES IN THE UPPER ABDOMEN: Redemonstrated left hepatic lobe cyst.     OSSEOUS STRUCTURES: Spinal degenerative changes are noted. No acute fracture or destructive osseous lesion.     IMPRESSION:     No new or enlarging pulmonary nodule. At least 16-month stability of tiny pulmonary nodules.     Continue surveillance at an imaging interval congruent with the patient's right breast cancer.           Computerized Assisted Algorithm (CAA) may have aided analysis of applicable images.     Workstation performed: [id number]        Imaging    CT chest wo contrast (Order: [id number]) - 6/10/2025

## 2025-07-09 NOTE — ASSESSMENT & PLAN NOTE
For CHEK2 mutation she was encouraged to have breast imaging studies, GYN examination and colonoscopy.  Patient had  colonoscopy and there was no malignancy.  Colonoscopy  showed polyps.  Patient saw her gynecologist  in [place] for GYN examination and breast examination.  She follows with her gynecologist in [place].

## 2025-07-09 NOTE — ASSESSMENT & PLAN NOTE
In September 2021 she was found to have 1.4 cm mass in the central portion of right breast and on biopsy that was ER positive, NJ positive and HER2 negative invasive breast cancer.  She had MRI scan of both breasts and also a biopsy of the left breast but that did not show malignancy.  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  Patient had bilateral lumpectomies and final report was 2 cm invasive ductal carcinoma, no lymphovascular invasion and negative sentinel lymph nodes.  ER positive.  NJ positive.  HER2 negative.  Ki-67 6%.  Oncotype score was 24.  No family history of breast or ovarian cancer.  Patient's mother had pleural malignancy.  Menarche at age 11 and first pregnancy at age 23.  Stage was 1A (T1c N0 M0 G1 ER positive, NJ positive, HER2 negative and Oncotype 24)  No other significant past history other than peptic ulcer disease and previous history of lumpectomy.  Former smoker, 10 cigarettes a day for about 20 years and she quit smoking in 2021.  No alcohol.  Patient decided against adjuvant therapy, chemotherapy and hormonal therapy.  She wanted to try alternative/natural therapy.  For CHEK2 mutation she was encouraged to have breast imaging studies, GYN examination and colonoscopy.  Patient had  colonoscopy and there was no malignancy.  Colonoscopy  showed polyps.  Patient saw her gynecologist  in [place] for GYN examination and breast examination.  She follows with her gynecologist in [place].  On 9/21/2023 patient had right breast lumpectomy and sentinel lymph node sampling and report was invasive carcinoma of no special type, grade 1, 1.2 cm tumor, 1 of 3 positive lymph node for metastatic disease, ER 99%, NJ 99%, HER2 negative (1+).  Again patient decided not to have radiation or adjuvant therapy.  Patient wanted to try alternative therapy/natural therapy .  She had CT scan and bone scan and also MRI scan of the abdomen because of liver lesion and there was no evidence of malignancy/metastatic  disease.   On 6/3/2025 patient had follow-up diagnostic mammography and ultrasound of right breast and that showed:  Mammo diagnostic right w 3d and cad  US breast right limited (diagnostic)  Status: Final result     PACS Images - GE     Show images for Mammo diagnostic right w 3d and cad  PACS Images - [facility]     Show images for Mammo diagnostic right w 3d and cad  Study Result    Narrative & Impression   DIAGNOSIS: Malignant neoplasm of right breast in female, estrogen receptor positive, unspecified site of breast (HCC); Regional lymph node metastasis present (HCC)      TECHNIQUE:   Digital diagnostic mammography was performed. Computer Aided Detection (CAD) analyzed all applicable images.  Right breast ultrasound was performed.      COMPARISONS: Prior breast imaging dated: 12/27/2024, 12/27/2024, 09/15/2023, 09/15/2023, 07/20/2023, 07/20/2023, 07/20/2023, 07/20/2023, 07/20/2023, 06/05/2023, 06/05/2023, 12/06/2021, 12/06/2021, 12/06/2021, 12/06/2021, 12/06/2021, 12/06/2021, 12/06/2021, 11/24/2021, 10/07/2021, and 10/07/2021     RELEVANT HISTORY:   Family Breast Cancer History: No known family history of breast cancer.  Family Medical History: No known relevant family medical history.   Personal History: No known relevant hormone history. Surgical history includes mastectomy. Medical history includes breast cancer.     RISK ASSESSMENT:   Tyrer-Cuzick risk assessment reporting was suppressed due to the patient's history and/or demographic factors.     TISSUE DENSITY:   There are scattered areas of fibroglandular density.     INDICATION: Brandi Godfrey is a 50 y.o. female presenting for Suspicious mass right breast.  Patient was previously recommended to have a right breast biopsy (2 site).  She did not want to have the procedure done at that time.  She presents today for follow-up imaging.  She has a personal history of breast cancer in 2023 and has had bilateral mastectomies with flap reconstruction.     FINDINGS:    RIGHT  1) MASS [A]  Mammo diagnostic right w 3d and cad: There are 4 or 5 similar irregularly shaped masses with spiculated margins seen in the upper inner quadrant of the right breast at 2 o'clock in the middle depth. Compared to the previous study, the masses increased in size and number.   US breast right limited (diagnostic): There are 3 similar irregularly shaped, non-parallel, hypoechoic masses with indistinct margins seen in the upper inner quadrant of the right breast.  Mass at 1:00, 8 cm from the nipple, posterior depth measures 7 x 5 x 3 mm.  There is internal vascularity.  Mass at 2:00, 4 cm from the nipple, anterior depth (appearing to involve the posterior aspect of the skin) measures 6 x 4 x 6 mm.  This previously measured 6 x 4 x 5 mm.  There is internal vascularity.  Mass at 3:00, 8 cm from the nipple measures 3 x 2 x 3 mm.  No other masses were seen on targeted ultrasound in the upper outer quadrant from 1:00 to 3:00.  2) LYMPH NODE [B]  US breast right limited (diagnostic): The previously described lymph node in the right axilla again demonstrates a cortical thickness of 3 mm.  This is within normal limits.  Ultrasound of the axilla was performed.  At least 1 other lymph node was seen.  No abnormal lymph nodes were seen.  3) MASS [C]  Mammo diagnostic right w 3d and cad: There is a 7 mm mass seen in the axillary tail region of the right breast at 10 o'clock.   US breast right limited (diagnostic): There is a 7 mm x 6 mm x 6 mm irregularly shaped, non-parallel, hypoechoic mass with indistinct margins seen in the axillary tail region of the right breast at 10 o'clock, 11 cm from the nipple.  There is internal vascularity.        IMPRESSION:  There is an increase in number of right breast masses (there are now 4-5 masses in the upper inner quadrant and 1 mass in the axillary tail).  These are highly suggestive of malignancy.  Appropriate action should be taken.  Ultrasound-guided core needle biopsy  of the mass in the axillary tail and one of the masses in the medial breast (either the mass at 1:00, 8 cm from the nipple or the mass at 2:00, 4 cm from the nipple) is recommended.  I personally discussed the imaging findings, my high level of concern for malignancy and my recommendation for 2 site biopsy with the patient and her daughter utilizing a .  I also explained the procedure and how it is typically well-tolerated by patients.  They expressed understanding.  They would like to talk to additional family members.  They will be contacted by the nurse navigator tomorrow to try and schedule the biopsy.           ASSESSMENT/BI-RADS CATEGORY:  Right: 5 - Highly Suggestive of Malignancy  Overall: 5 - Highly Suggestive of Malignancy     RECOMMENDATION:       - Ultrasound-guided breast biopsy for the right breast.     Workstation ID: [id number]        Signed by:  Xin Cheek MD                 Imaging    Mammo diagnostic right w 3d and cad (Order: [id number]) - 6/3/2025  Again patient decided not to have biopsy.  When I explained these abnormalities  to the patient and tried to convince her for the biopsy she agreed and I passed that information to breast imaging center and Catia reported back that they will contact the patient.    Patient also has appointment with her breast surgeon in [place] and she plans to keep that appointment

## 2025-08-06 ENCOUNTER — HOSPITAL ENCOUNTER (OUTPATIENT)
Dept: ULTRASOUND IMAGING | Facility: CLINIC | Age: 50
Discharge: HOME | End: 2025-08-06
Attending: INTERNAL MEDICINE